# Patient Record
Sex: FEMALE | Race: OTHER | HISPANIC OR LATINO | Employment: FULL TIME | URBAN - METROPOLITAN AREA
[De-identification: names, ages, dates, MRNs, and addresses within clinical notes are randomized per-mention and may not be internally consistent; named-entity substitution may affect disease eponyms.]

---

## 2017-01-11 ENCOUNTER — ALLSCRIPTS OFFICE VISIT (OUTPATIENT)
Dept: OTHER | Facility: OTHER | Age: 32
End: 2017-01-11

## 2017-02-01 ENCOUNTER — ALLSCRIPTS OFFICE VISIT (OUTPATIENT)
Dept: OTHER | Facility: OTHER | Age: 32
End: 2017-02-01

## 2017-02-03 ENCOUNTER — HOSPITAL ENCOUNTER (EMERGENCY)
Facility: HOSPITAL | Age: 32
Discharge: HOME | End: 2017-02-03
Attending: EMERGENCY MEDICINE | Admitting: EMERGENCY MEDICINE
Payer: COMMERCIAL

## 2017-02-03 VITALS
OXYGEN SATURATION: 100 % | BODY MASS INDEX: 27.6 KG/M2 | SYSTOLIC BLOOD PRESSURE: 110 MMHG | HEART RATE: 67 BPM | WEIGHT: 150 LBS | TEMPERATURE: 97.8 F | HEIGHT: 62 IN | DIASTOLIC BLOOD PRESSURE: 59 MMHG | RESPIRATION RATE: 18 BRPM

## 2017-02-03 DIAGNOSIS — N93.9 VAGINAL BLEEDING: Primary | ICD-10-CM

## 2017-02-03 LAB
APTT PPP: 29 SECONDS (ref 24–36)
BACTERIA UR QL AUTO: ABNORMAL /HPF
BASOPHILS # BLD AUTO: 0.06 THOUSANDS/ΜL (ref 0–0.1)
BASOPHILS NFR BLD AUTO: 1 % (ref 0–1)
BILIRUB UR QL STRIP: NEGATIVE
CLARITY UR: ABNORMAL
COLOR UR: YELLOW
COLOR, POC: YELLOW
EOSINOPHIL # BLD AUTO: 0.28 THOUSAND/ΜL (ref 0–0.61)
EOSINOPHIL NFR BLD AUTO: 4 % (ref 0–6)
ERYTHROCYTE [DISTWIDTH] IN BLOOD BY AUTOMATED COUNT: 13.2 % (ref 11.6–15.1)
GLUCOSE UR STRIP-MCNC: NEGATIVE MG/DL
HCG UR QL: NEGATIVE
HCT VFR BLD AUTO: 39.2 % (ref 34.8–46.1)
HGB BLD-MCNC: 13.4 G/DL (ref 11.5–15.4)
HGB UR QL STRIP.AUTO: ABNORMAL
HYALINE CASTS #/AREA URNS LPF: ABNORMAL /LPF
INR PPP: 1 (ref 0.86–1.16)
KETONES UR STRIP-MCNC: NEGATIVE MG/DL
LEUKOCYTE ESTERASE UR QL STRIP: NEGATIVE
LYMPHOCYTES # BLD AUTO: 2.77 THOUSANDS/ΜL (ref 0.6–4.47)
LYMPHOCYTES NFR BLD AUTO: 36 % (ref 14–44)
MCH RBC QN AUTO: 29.9 PG (ref 26.8–34.3)
MCHC RBC AUTO-ENTMCNC: 34.2 G/DL (ref 31.4–37.4)
MCV RBC AUTO: 88 FL (ref 82–98)
MONOCYTES # BLD AUTO: 0.57 THOUSAND/ΜL (ref 0.17–1.22)
MONOCYTES NFR BLD AUTO: 7 % (ref 4–12)
NEUTROPHILS # BLD AUTO: 4.1 THOUSANDS/ΜL (ref 1.85–7.62)
NEUTS SEG NFR BLD AUTO: 52 % (ref 43–75)
NITRITE UR QL STRIP: NEGATIVE
NON-SQ EPI CELLS URNS QL MICRO: ABNORMAL /HPF
NRBC BLD AUTO-RTO: 0 /100 WBCS
PH UR STRIP.AUTO: 6 [PH] (ref 4.5–8)
PLATELET # BLD AUTO: 369 THOUSANDS/UL (ref 149–390)
PMV BLD AUTO: 10.1 FL (ref 8.9–12.7)
PROT UR STRIP-MCNC: NEGATIVE MG/DL
PROTHROMBIN TIME: 13.3 SECONDS (ref 12–14.3)
RBC # BLD AUTO: 4.48 MILLION/UL (ref 3.81–5.12)
RBC #/AREA URNS AUTO: ABNORMAL /HPF
SP GR UR STRIP.AUTO: >=1.03 (ref 1–1.03)
UROBILINOGEN UR QL STRIP.AUTO: 0.2 E.U./DL
WBC # BLD AUTO: 7.8 THOUSAND/UL (ref 4.31–10.16)
WBC #/AREA URNS AUTO: ABNORMAL /HPF

## 2017-02-03 PROCEDURE — 96375 TX/PRO/DX INJ NEW DRUG ADDON: CPT

## 2017-02-03 PROCEDURE — 85025 COMPLETE CBC W/AUTO DIFF WBC: CPT | Performed by: EMERGENCY MEDICINE

## 2017-02-03 PROCEDURE — 81002 URINALYSIS NONAUTO W/O SCOPE: CPT | Performed by: EMERGENCY MEDICINE

## 2017-02-03 PROCEDURE — 87086 URINE CULTURE/COLONY COUNT: CPT

## 2017-02-03 PROCEDURE — 81025 URINE PREGNANCY TEST: CPT | Performed by: EMERGENCY MEDICINE

## 2017-02-03 PROCEDURE — 85610 PROTHROMBIN TIME: CPT | Performed by: EMERGENCY MEDICINE

## 2017-02-03 PROCEDURE — 96361 HYDRATE IV INFUSION ADD-ON: CPT

## 2017-02-03 PROCEDURE — 36415 COLL VENOUS BLD VENIPUNCTURE: CPT | Performed by: EMERGENCY MEDICINE

## 2017-02-03 PROCEDURE — 81001 URINALYSIS AUTO W/SCOPE: CPT

## 2017-02-03 PROCEDURE — 96374 THER/PROPH/DIAG INJ IV PUSH: CPT

## 2017-02-03 PROCEDURE — 85730 THROMBOPLASTIN TIME PARTIAL: CPT | Performed by: EMERGENCY MEDICINE

## 2017-02-03 PROCEDURE — 99284 EMERGENCY DEPT VISIT MOD MDM: CPT

## 2017-02-03 RX ORDER — KETOROLAC TROMETHAMINE 30 MG/ML
15 INJECTION, SOLUTION INTRAMUSCULAR; INTRAVENOUS ONCE
Status: COMPLETED | OUTPATIENT
Start: 2017-02-03 | End: 2017-02-03

## 2017-02-03 RX ORDER — ONDANSETRON 2 MG/ML
4 INJECTION INTRAMUSCULAR; INTRAVENOUS ONCE
Status: COMPLETED | OUTPATIENT
Start: 2017-02-03 | End: 2017-02-03

## 2017-02-03 RX ORDER — IBUPROFEN 600 MG/1
600 TABLET ORAL EVERY 6 HOURS PRN
Qty: 30 TABLET | Refills: 0 | Status: SHIPPED | OUTPATIENT
Start: 2017-02-03 | End: 2017-06-16 | Stop reason: ALTCHOICE

## 2017-02-03 RX ADMIN — ONDANSETRON 4 MG: 2 INJECTION INTRAMUSCULAR; INTRAVENOUS at 18:34

## 2017-02-03 RX ADMIN — SODIUM CHLORIDE 1000 ML: 0.9 INJECTION, SOLUTION INTRAVENOUS at 18:06

## 2017-02-03 RX ADMIN — KETOROLAC TROMETHAMINE 15 MG: 30 INJECTION, SOLUTION INTRAMUSCULAR at 18:35

## 2017-02-04 LAB — BACTERIA UR CULT: NORMAL

## 2017-02-08 ENCOUNTER — ALLSCRIPTS OFFICE VISIT (OUTPATIENT)
Dept: OTHER | Facility: OTHER | Age: 32
End: 2017-02-08

## 2017-02-08 DIAGNOSIS — D06.9 CARCINOMA IN SITU OF CERVIX: ICD-10-CM

## 2017-02-08 LAB — HCG, QUALITATIVE (HISTORICAL): NEGATIVE

## 2017-02-08 PROCEDURE — 88344 IMHCHEM/IMCYTCHM EA MLT ANTB: CPT | Performed by: OBSTETRICS & GYNECOLOGY

## 2017-02-08 PROCEDURE — 88305 TISSUE EXAM BY PATHOLOGIST: CPT | Performed by: OBSTETRICS & GYNECOLOGY

## 2017-02-08 PROCEDURE — 88342 IMHCHEM/IMCYTCHM 1ST ANTB: CPT | Performed by: OBSTETRICS & GYNECOLOGY

## 2017-02-09 ENCOUNTER — LAB REQUISITION (OUTPATIENT)
Dept: LAB | Facility: HOSPITAL | Age: 32
End: 2017-02-09
Payer: COMMERCIAL

## 2017-02-09 DIAGNOSIS — D06.9 CARCINOMA IN SITU OF CERVIX: ICD-10-CM

## 2017-03-01 ENCOUNTER — ALLSCRIPTS OFFICE VISIT (OUTPATIENT)
Dept: OTHER | Facility: OTHER | Age: 32
End: 2017-03-01

## 2017-05-02 ENCOUNTER — GENERIC CONVERSION - ENCOUNTER (OUTPATIENT)
Dept: OTHER | Facility: OTHER | Age: 32
End: 2017-05-02

## 2017-05-11 ENCOUNTER — ALLSCRIPTS OFFICE VISIT (OUTPATIENT)
Dept: OTHER | Facility: OTHER | Age: 32
End: 2017-05-11

## 2017-06-08 ENCOUNTER — ALLSCRIPTS OFFICE VISIT (OUTPATIENT)
Dept: OTHER | Facility: OTHER | Age: 32
End: 2017-06-08

## 2017-06-08 DIAGNOSIS — Z01.818 ENCOUNTER FOR OTHER PREPROCEDURAL EXAMINATION: ICD-10-CM

## 2017-06-15 ENCOUNTER — APPOINTMENT (OUTPATIENT)
Dept: LAB | Facility: HOSPITAL | Age: 32
End: 2017-06-15
Payer: COMMERCIAL

## 2017-06-15 ENCOUNTER — TRANSCRIBE ORDERS (OUTPATIENT)
Dept: LAB | Facility: HOSPITAL | Age: 32
End: 2017-06-15

## 2017-06-15 DIAGNOSIS — Z01.818 ENCOUNTER FOR OTHER PREPROCEDURAL EXAMINATION: ICD-10-CM

## 2017-06-15 LAB
ABO GROUP BLD: NORMAL
BACTERIA UR QL AUTO: ABNORMAL /HPF
BASOPHILS # BLD AUTO: 0.05 THOUSANDS/ΜL (ref 0–0.1)
BASOPHILS NFR BLD AUTO: 1 % (ref 0–1)
BILIRUB UR QL STRIP: NEGATIVE
BLD GP AB SCN SERPL QL: NEGATIVE
CLARITY UR: ABNORMAL
COLOR UR: ABNORMAL
EOSINOPHIL # BLD AUTO: 0.24 THOUSAND/ΜL (ref 0–0.61)
EOSINOPHIL NFR BLD AUTO: 2 % (ref 0–6)
ERYTHROCYTE [DISTWIDTH] IN BLOOD BY AUTOMATED COUNT: 14 % (ref 11.6–15.1)
GLUCOSE UR STRIP-MCNC: NEGATIVE MG/DL
HCT VFR BLD AUTO: 38.6 % (ref 34.8–46.1)
HGB BLD-MCNC: 13.3 G/DL (ref 11.5–15.4)
HGB UR QL STRIP.AUTO: NEGATIVE
KETONES UR STRIP-MCNC: NEGATIVE MG/DL
LEUKOCYTE ESTERASE UR QL STRIP: ABNORMAL
LYMPHOCYTES # BLD AUTO: 2.45 THOUSANDS/ΜL (ref 0.6–4.47)
LYMPHOCYTES NFR BLD AUTO: 24 % (ref 14–44)
MCH RBC QN AUTO: 29.6 PG (ref 26.8–34.3)
MCHC RBC AUTO-ENTMCNC: 34.5 G/DL (ref 31.4–37.4)
MCV RBC AUTO: 86 FL (ref 82–98)
MONOCYTES # BLD AUTO: 0.86 THOUSAND/ΜL (ref 0.17–1.22)
MONOCYTES NFR BLD AUTO: 8 % (ref 4–12)
MUCOUS THREADS UR QL AUTO: ABNORMAL
NEUTROPHILS # BLD AUTO: 6.68 THOUSANDS/ΜL (ref 1.85–7.62)
NEUTS SEG NFR BLD AUTO: 65 % (ref 43–75)
NITRITE UR QL STRIP: NEGATIVE
NON-SQ EPI CELLS URNS QL MICRO: ABNORMAL /HPF
NRBC BLD AUTO-RTO: 0 /100 WBCS
PH UR STRIP.AUTO: 5.5 [PH] (ref 4.5–8)
PLATELET # BLD AUTO: 380 THOUSANDS/UL (ref 149–390)
PMV BLD AUTO: 10.4 FL (ref 8.9–12.7)
PROT UR STRIP-MCNC: NEGATIVE MG/DL
RBC # BLD AUTO: 4.49 MILLION/UL (ref 3.81–5.12)
RBC #/AREA URNS AUTO: ABNORMAL /HPF
RH BLD: POSITIVE
SP GR UR STRIP.AUTO: 1.03 (ref 1–1.03)
SPECIMEN EXPIRATION DATE: NORMAL
UROBILINOGEN UR QL STRIP.AUTO: 0.2 E.U./DL
WBC # BLD AUTO: 10.3 THOUSAND/UL (ref 4.31–10.16)
WBC #/AREA URNS AUTO: ABNORMAL /HPF

## 2017-06-15 PROCEDURE — 85025 COMPLETE CBC W/AUTO DIFF WBC: CPT

## 2017-06-15 PROCEDURE — 81001 URINALYSIS AUTO W/SCOPE: CPT

## 2017-06-15 PROCEDURE — 86850 RBC ANTIBODY SCREEN: CPT

## 2017-06-15 PROCEDURE — 86900 BLOOD TYPING SEROLOGIC ABO: CPT

## 2017-06-15 PROCEDURE — 36415 COLL VENOUS BLD VENIPUNCTURE: CPT

## 2017-06-15 PROCEDURE — 86901 BLOOD TYPING SEROLOGIC RH(D): CPT

## 2017-06-16 RX ORDER — NORETHINDRONE ACETATE AND ETHINYL ESTRADIOL AND FERROUS FUMARATE 1MG-20(24)
1 KIT ORAL DAILY
COMMUNITY
End: 2017-06-22 | Stop reason: HOSPADM

## 2017-06-21 ENCOUNTER — ANESTHESIA EVENT (OUTPATIENT)
Dept: PERIOP | Facility: HOSPITAL | Age: 32
End: 2017-06-21
Payer: COMMERCIAL

## 2017-06-22 ENCOUNTER — ANESTHESIA (OUTPATIENT)
Dept: PERIOP | Facility: HOSPITAL | Age: 32
End: 2017-06-22
Payer: COMMERCIAL

## 2017-06-22 ENCOUNTER — HOSPITAL ENCOUNTER (OUTPATIENT)
Facility: HOSPITAL | Age: 32
Setting detail: OUTPATIENT SURGERY
Discharge: HOME | End: 2017-06-22
Attending: OBSTETRICS & GYNECOLOGY | Admitting: OBSTETRICS & GYNECOLOGY
Payer: COMMERCIAL

## 2017-06-22 VITALS
BODY MASS INDEX: 27.42 KG/M2 | HEIGHT: 62 IN | SYSTOLIC BLOOD PRESSURE: 96 MMHG | HEART RATE: 66 BPM | DIASTOLIC BLOOD PRESSURE: 57 MMHG | OXYGEN SATURATION: 99 % | WEIGHT: 149 LBS | RESPIRATION RATE: 18 BRPM | TEMPERATURE: 99.8 F

## 2017-06-22 DIAGNOSIS — Z64.1 PROBLEMS RELATED TO MULTIPARITY: ICD-10-CM

## 2017-06-22 DIAGNOSIS — Z01.411 ENCOUNTER FOR GYNECOLOGICAL EXAMINATION WITH ABNORMAL FINDING: ICD-10-CM

## 2017-06-22 LAB — EXT PREGNANCY TEST URINE: NEGATIVE

## 2017-06-22 PROCEDURE — 88342 IMHCHEM/IMCYTCHM 1ST ANTB: CPT | Performed by: OBSTETRICS & GYNECOLOGY

## 2017-06-22 PROCEDURE — 88307 TISSUE EXAM BY PATHOLOGIST: CPT | Performed by: OBSTETRICS & GYNECOLOGY

## 2017-06-22 PROCEDURE — 81025 URINE PREGNANCY TEST: CPT | Performed by: OBSTETRICS & GYNECOLOGY

## 2017-06-22 PROCEDURE — 88305 TISSUE EXAM BY PATHOLOGIST: CPT | Performed by: OBSTETRICS & GYNECOLOGY

## 2017-06-22 PROCEDURE — 88302 TISSUE EXAM BY PATHOLOGIST: CPT | Performed by: OBSTETRICS & GYNECOLOGY

## 2017-06-22 RX ORDER — ONDANSETRON 2 MG/ML
4 INJECTION INTRAMUSCULAR; INTRAVENOUS EVERY 6 HOURS PRN
Status: DISCONTINUED | OUTPATIENT
Start: 2017-06-22 | End: 2017-06-22 | Stop reason: HOSPADM

## 2017-06-22 RX ORDER — KETOROLAC TROMETHAMINE 30 MG/ML
INJECTION, SOLUTION INTRAMUSCULAR; INTRAVENOUS AS NEEDED
Status: DISCONTINUED | OUTPATIENT
Start: 2017-06-22 | End: 2017-06-22 | Stop reason: SURG

## 2017-06-22 RX ORDER — ONDANSETRON 2 MG/ML
INJECTION INTRAMUSCULAR; INTRAVENOUS AS NEEDED
Status: DISCONTINUED | OUTPATIENT
Start: 2017-06-22 | End: 2017-06-22 | Stop reason: SURG

## 2017-06-22 RX ORDER — OXYCODONE HYDROCHLORIDE 10 MG/1
10 TABLET ORAL EVERY 4 HOURS PRN
Status: DISCONTINUED | OUTPATIENT
Start: 2017-06-22 | End: 2017-06-22 | Stop reason: HOSPADM

## 2017-06-22 RX ORDER — FERRIC SUBSULFATE 0.21 G/G
LIQUID TOPICAL AS NEEDED
Status: DISCONTINUED | OUTPATIENT
Start: 2017-06-22 | End: 2017-06-22 | Stop reason: HOSPADM

## 2017-06-22 RX ORDER — LIDOCAINE HYDROCHLORIDE 40 MG/ML
SOLUTION TOPICAL AS NEEDED
Status: DISCONTINUED | OUTPATIENT
Start: 2017-06-22 | End: 2017-06-22 | Stop reason: SURG

## 2017-06-22 RX ORDER — LIDOCAINE HYDROCHLORIDE 10 MG/ML
INJECTION, SOLUTION INFILTRATION; PERINEURAL AS NEEDED
Status: DISCONTINUED | OUTPATIENT
Start: 2017-06-22 | End: 2017-06-22 | Stop reason: SURG

## 2017-06-22 RX ORDER — SODIUM CHLORIDE, SODIUM LACTATE, POTASSIUM CHLORIDE, CALCIUM CHLORIDE 600; 310; 30; 20 MG/100ML; MG/100ML; MG/100ML; MG/100ML
125 INJECTION, SOLUTION INTRAVENOUS CONTINUOUS
Status: DISCONTINUED | OUTPATIENT
Start: 2017-06-22 | End: 2017-06-22 | Stop reason: HOSPADM

## 2017-06-22 RX ORDER — IODINE SOLUTION STRONG 5% (LUGOL'S) 5 %
SOLUTION ORAL AS NEEDED
Status: DISCONTINUED | OUTPATIENT
Start: 2017-06-22 | End: 2017-06-22 | Stop reason: HOSPADM

## 2017-06-22 RX ORDER — IBUPROFEN 600 MG/1
600 TABLET ORAL EVERY 6 HOURS PRN
Status: DISCONTINUED | OUTPATIENT
Start: 2017-06-22 | End: 2017-06-22 | Stop reason: HOSPADM

## 2017-06-22 RX ORDER — ROCURONIUM BROMIDE 10 MG/ML
INJECTION, SOLUTION INTRAVENOUS AS NEEDED
Status: DISCONTINUED | OUTPATIENT
Start: 2017-06-22 | End: 2017-06-22 | Stop reason: SURG

## 2017-06-22 RX ORDER — GLYCOPYRROLATE 0.2 MG/ML
INJECTION INTRAMUSCULAR; INTRAVENOUS AS NEEDED
Status: DISCONTINUED | OUTPATIENT
Start: 2017-06-22 | End: 2017-06-22 | Stop reason: SURG

## 2017-06-22 RX ORDER — PROMETHAZINE HYDROCHLORIDE 25 MG/ML
12.5 INJECTION, SOLUTION INTRAMUSCULAR; INTRAVENOUS ONCE AS NEEDED
Status: COMPLETED | OUTPATIENT
Start: 2017-06-22 | End: 2017-06-22

## 2017-06-22 RX ORDER — DEXTROSE MONOHYDRATE AND SODIUM CHLORIDE 5; .45 G/100ML; G/100ML
125 INJECTION, SOLUTION INTRAVENOUS CONTINUOUS
Status: DISCONTINUED | OUTPATIENT
Start: 2017-06-22 | End: 2017-06-22 | Stop reason: HOSPADM

## 2017-06-22 RX ORDER — FENTANYL CITRATE 50 UG/ML
INJECTION, SOLUTION INTRAMUSCULAR; INTRAVENOUS AS NEEDED
Status: DISCONTINUED | OUTPATIENT
Start: 2017-06-22 | End: 2017-06-22 | Stop reason: SURG

## 2017-06-22 RX ORDER — PROPOFOL 10 MG/ML
INJECTION, EMULSION INTRAVENOUS AS NEEDED
Status: DISCONTINUED | OUTPATIENT
Start: 2017-06-22 | End: 2017-06-22 | Stop reason: SURG

## 2017-06-22 RX ORDER — MIDAZOLAM HYDROCHLORIDE 1 MG/ML
INJECTION INTRAMUSCULAR; INTRAVENOUS AS NEEDED
Status: DISCONTINUED | OUTPATIENT
Start: 2017-06-22 | End: 2017-06-22 | Stop reason: SURG

## 2017-06-22 RX ORDER — OXYCODONE HYDROCHLORIDE AND ACETAMINOPHEN 5; 325 MG/1; MG/1
1 TABLET ORAL EVERY 4 HOURS PRN
Status: DISCONTINUED | OUTPATIENT
Start: 2017-06-22 | End: 2017-06-22 | Stop reason: HOSPADM

## 2017-06-22 RX ORDER — FENTANYL CITRATE/PF 50 MCG/ML
25 SYRINGE (ML) INJECTION
Status: COMPLETED | OUTPATIENT
Start: 2017-06-22 | End: 2017-06-22

## 2017-06-22 RX ORDER — OXYCODONE HYDROCHLORIDE AND ACETAMINOPHEN 5; 325 MG/1; MG/1
1 TABLET ORAL EVERY 4 HOURS PRN
Qty: 20 TABLET | Refills: 0 | Status: SHIPPED | OUTPATIENT
Start: 2017-06-22 | End: 2017-07-02

## 2017-06-22 RX ORDER — LIDOCAINE HYDROCHLORIDE AND EPINEPHRINE 10; 10 MG/ML; UG/ML
INJECTION, SOLUTION INFILTRATION; PERINEURAL AS NEEDED
Status: DISCONTINUED | OUTPATIENT
Start: 2017-06-22 | End: 2017-06-22 | Stop reason: HOSPADM

## 2017-06-22 RX ADMIN — SODIUM CHLORIDE, SODIUM LACTATE, POTASSIUM CHLORIDE, AND CALCIUM CHLORIDE: .6; .31; .03; .02 INJECTION, SOLUTION INTRAVENOUS at 07:30

## 2017-06-22 RX ADMIN — FENTANYL CITRATE 25 MCG: 50 INJECTION INTRAMUSCULAR; INTRAVENOUS at 10:15

## 2017-06-22 RX ADMIN — KETOROLAC TROMETHAMINE 30 MG: 30 INJECTION, SOLUTION INTRAMUSCULAR at 08:38

## 2017-06-22 RX ADMIN — DEXAMETHASONE SODIUM PHOSPHATE 10 MG: 10 INJECTION INTRAMUSCULAR; INTRAVENOUS at 08:30

## 2017-06-22 RX ADMIN — PROMETHAZINE HYDROCHLORIDE 12.5 MG: 25 INJECTION INTRAMUSCULAR; INTRAVENOUS at 09:18

## 2017-06-22 RX ADMIN — LIDOCAINE HYDROCHLORIDE 1 APPLICATION: 40 SOLUTION TOPICAL at 07:55

## 2017-06-22 RX ADMIN — NEOSTIGMINE METHYLSULFATE 2.5 MG: 1 INJECTION, SOLUTION INTRAMUSCULAR; INTRAVENOUS; SUBCUTANEOUS at 08:55

## 2017-06-22 RX ADMIN — PROPOFOL 200 MG: 10 INJECTION, EMULSION INTRAVENOUS at 07:50

## 2017-06-22 RX ADMIN — FENTANYL CITRATE 25 MCG: 50 INJECTION INTRAMUSCULAR; INTRAVENOUS at 09:20

## 2017-06-22 RX ADMIN — SODIUM CHLORIDE, SODIUM LACTATE, POTASSIUM CHLORIDE, AND CALCIUM CHLORIDE: .6; .31; .03; .02 INJECTION, SOLUTION INTRAVENOUS at 08:05

## 2017-06-22 RX ADMIN — SODIUM CHLORIDE, SODIUM LACTATE, POTASSIUM CHLORIDE, AND CALCIUM CHLORIDE 125 ML/HR: .6; .31; .03; .02 INJECTION, SOLUTION INTRAVENOUS at 09:54

## 2017-06-22 RX ADMIN — FENTANYL CITRATE 25 MCG: 50 INJECTION INTRAMUSCULAR; INTRAVENOUS at 09:41

## 2017-06-22 RX ADMIN — ONDANSETRON 4 MG: 2 INJECTION INTRAMUSCULAR; INTRAVENOUS at 08:30

## 2017-06-22 RX ADMIN — OXYCODONE HYDROCHLORIDE AND ACETAMINOPHEN 1 TABLET: 5; 325 TABLET ORAL at 11:20

## 2017-06-22 RX ADMIN — FENTANYL CITRATE 50 MCG: 50 INJECTION, SOLUTION INTRAMUSCULAR; INTRAVENOUS at 07:55

## 2017-06-22 RX ADMIN — ROCURONIUM BROMIDE 25 MG: 10 INJECTION, SOLUTION INTRAVENOUS at 07:50

## 2017-06-22 RX ADMIN — MIDAZOLAM HYDROCHLORIDE 2 MG: 1 INJECTION, SOLUTION INTRAMUSCULAR; INTRAVENOUS at 07:40

## 2017-06-22 RX ADMIN — FENTANYL CITRATE 25 MCG: 50 INJECTION INTRAMUSCULAR; INTRAVENOUS at 09:15

## 2017-06-22 RX ADMIN — FENTANYL CITRATE 50 MCG: 50 INJECTION, SOLUTION INTRAMUSCULAR; INTRAVENOUS at 08:23

## 2017-06-22 RX ADMIN — FENTANYL CITRATE 50 MCG: 50 INJECTION, SOLUTION INTRAMUSCULAR; INTRAVENOUS at 09:05

## 2017-06-22 RX ADMIN — FENTANYL CITRATE 50 MCG: 50 INJECTION, SOLUTION INTRAMUSCULAR; INTRAVENOUS at 09:10

## 2017-06-22 RX ADMIN — LIDOCAINE HYDROCHLORIDE 50 MG: 10 INJECTION, SOLUTION INFILTRATION; PERINEURAL at 07:50

## 2017-06-22 RX ADMIN — GLYCOPYRROLATE 0.4 MG: 0.2 INJECTION INTRAMUSCULAR; INTRAVENOUS at 08:55

## 2017-06-23 ENCOUNTER — GENERIC CONVERSION - ENCOUNTER (OUTPATIENT)
Dept: OTHER | Facility: OTHER | Age: 32
End: 2017-06-23

## 2017-06-28 ENCOUNTER — GENERIC CONVERSION - ENCOUNTER (OUTPATIENT)
Dept: OTHER | Facility: OTHER | Age: 32
End: 2017-06-28

## 2018-01-10 NOTE — MISCELLANEOUS
Message  telephone call to patient to inform of positive UTI  Instructed to pick-up script and take as directed until completed      Active Problems    1  Abnormal cells of cervix (622 10) (N87 9)   2  Cervical dysplasia (622 10) (N87 9)   3  ALVERTO III (cervical intraepithelial neoplasia grade III) with severe dysplasia (233 1) (D06 9)   4  Encounter for fetal anatomic survey (V28 81) (Z36)   5  Encounter for screening for risk of pre-term labor (V28 82) (Z36)   6  [address] multipara (V61 5) (Z64 1)   7  Headache (784 0) (R51)   8  Supervision of normal pregnancy (V22 1) (Z34 90)   9  Urinary pain (788 1) (R30 9)   10  UTI (urinary tract infection) (599 0) (N39 0)    Current Meds   1  Fioricet -40 MG Oral Capsule; 1-2 tabs every 4 - 6 hours as needed for mild to   moderate headache; Therapy: 47XUC0210 to (Last IJ:72LAU4952)  Requested for: 46TEY7235 Ordered   2  Nitrofurantoin Monohyd Macro 100 MG Oral Capsule; TAKE 1 CAPSULE EVERY 12   HOURS DAILY; Therapy: 24RIX0975 to (Philipp Guzman)  Requested for: 90ZAO3944; Last   Rx:25Edk4117; Status: ACTIVE - Transmit to Pharmacy - Awaiting Verification Ordered   3  Prenatal 28-0 8 MG Oral Tablet; Therapy: 13Cig7960 to Recorded    Allergies    1  No Known Drug Allergies    2  No Known Environmental Allergies   3   No Known Food Allergies    Signatures   Electronically signed by : Amy Licea RN; Jul 18 2016  1:18PM EST                       (Author)

## 2018-01-10 NOTE — MISCELLANEOUS
Message  telephone call to patient to inform of need for 3hour GTT  Will pick-up slip and have drawn tomorrow      Active Problems    1  Abnormal cells of cervix (622 10) (N87 9)   2  Cervical dysplasia (622 10) (N87 9)   3  ALVERTO III (cervical intraepithelial neoplasia grade III) with severe dysplasia (233 1) (D06 9)   4  Encounter for fetal anatomic survey (V28 81) (Z36)   5  Encounter for pregnancy related examination in third trimester (V22 1) (Z34 83)   6  Encounter for screening for risk of pre-term labor (V28 82) (Z36)   7  [address] multipara (V61 5) (Z64 1)   8  Headache (784 0) (R51)   9  Need for Tdap vaccination (V06 1) (Z23)   10  Polyhydramnios in winter pregnancy in third trimester (657 03) (O40 3XX0)   11  Supervision of normal pregnancy (V22 1) (Z34 90)   12  Urinary pain (788 1) (R30 9)   13  UTI (urinary tract infection) (599 0) (N39 0)    Current Meds   1  Fioricet -40 MG Oral Capsule; 1-2 tabs every 4 - 6 hours as needed for mild to   moderate headache; Therapy: 94OAF3603 to (Last WW:32ZGV2298)  Requested for: 12PRT6544 Ordered   2  Nitrofurantoin Monohyd Macro 100 MG Oral Capsule; TAKE 1 CAPSULE EVERY 12   HOURS DAILY; Therapy: 79FOW0481 to (Pradip Edwards)  Requested for: 87IHH0884; Last   Rx:32Pfz3240 Ordered   3  Prenatal 28-0 8 MG Oral Tablet; Therapy: 84Shc2121 to Recorded    Allergies    1  No Known Drug Allergies    2  No Known Environmental Allergies   3   No Known Food Allergies    Signatures   Electronically signed by : Surinder Foy RN; Sep  1 2016  1:16PM EST                       (Author)

## 2018-01-10 NOTE — PROGRESS NOTES
MAY 16 2016         RE: Edgar Banks                                      To: [facility]   MR#: [id number]                                   [address] [place] [address]   : [address] [facility], [address]   ENC: [id number]                             Fax: [phone]   (Exam #: [id number])      The LMP of this 33year old,  G6, P5-0-0-5 patient was DEC 25 2015, giving   her an YOLY of SEP 30 2016 and a current gestational age of 25 weeks 3 days   by dates  A sonographic examination was performed on MAY 16 2016 using   real time equipment  The patient has a BMI of 27 6  Her blood pressure   today was 101/54  Earliest ultrasound found in her record: 3/1/16   9w2d  10/2/16 YOLY      John Gordillo reports occasional vaginal spotting  Otherwise, she has no complaints  She reports regular fetal movement  John Gordillo declined genetic screening earlier   in the pregnancy        Cardiac motion was observed at 138 bpm       INDICATIONS      long  interval pregnancy   fetal anatomical survey      Exam Types      LEVEL II   Transvaginal      RESULTS      Fetus # 1 of 1   Transverse presentation   Fetal growth appeared normal   Placenta Location = Posterior   Placenta Grade = I      MEASUREMENTS (* Included In Average GA)      AC              16 0 cm        20 weeks 6 days* (59%)   BPD              4 7 cm        20 weeks 1 day * (45%)   HC              17 4 cm        19 weeks 6 days* (33%)   Femur            3 4 cm        20 weeks 6 days* (50%)      Nuchal Fold      3 3 mm      Humerus          3 2 cm        20 weeks 5 days  (57%)   Radius           2 6 cm        20 weeks 6 days   Ulna             3 0 cm        21 weeks 0 days   Tibia            2 9 cm        20 weeks 5 days  (51%)   Fibula           2 9 cm        19 weeks 6 days   Foot             3 5 cm        20 weeks 5 days      Cerebellum       2 2 cm        21 weeks 2 days   Biorbit          3 2 cm        20 weeks 3 days CisternaMagna    3 2 mm      HC/AC           1 09   FL/AC           0 21   FL/BPD          0 72   EFW (Ac/Fl/Hc)   373 grams - 0 lbs 13 oz      THE AVERAGE GESTATIONAL AGE is 20 weeks 4 days +/- 10 days  AMNIOTIC FLUID         Largest Vertical Pocket = 5 4 cm   Amniotic Fluid: Normal      ANATOMY SUMMARY      The fetal cranium appeared normal in shape  Choroid plexus cysts are not   present  The lateral ventricles were not dilated and the midline   structures were not deviated  The cerebellum and cisterna magna were   visualized and appeared normal  The nuchal fold is not abnormally   thickened, measured at 3 3 mm  The calvarium showed no evidence of defect,   scalloping of the parietal bones or abnormal shape  The cavum septum   pellucidum appears normal  The fetal face appears normal  There is no   evidence of facial clefting, hypotelorism, hypertelorism or micrognathia  A normal appearing nasal bone measuring 8 5 mm was identified in the   sagittal profile view  3-D views of the face appeared normal  Anatomy of   the fetal thorax appeared within normal limits  The fetal diaphragm   appears intact  There were no intrathoracic masses noted or evidence of   pleural/pericardial effusion  The cardiac arch views appeared normal     There was no suspicion of tricuspid regurgitation  The cardiac size and   structures appeared sonographically normal at the four chamber view, and   cardiac rhythm was regular  There is no suspicion of fetal dysrhythmia  There was no evidence of cardiomegaly, pericardial effusion or   atrial/ventricular disproportion  Two atrioventricular valves were noted   with normal inflow, confirmed with color Doppler imaging  Ventriculoarterial connections are appropriate and normal short axis of   the great vessels is demonstrated  The interventricular septum appeared to   be intact  There is no suspicion of an echogenic intracardiac focus    The   three vessel  tracheal view appears normal   The outflow tract views are   normal  The abdominal cavity appears normal  There is no evidence of fetal   bowel obstruction or abnormally echogenic bowel  Ascites is not present  The fetal stomach appears normal in size and shape  The right kidney   appears normal  There is no suspicion of pyelectasis  Renal cysts are not   present  The echogenicity of the kidney is normal  The left kidney   appears normal   There is no suspicion of pyelectasis  The echogenicity   of the kidney is normal   renal cysts are not present  The fetal bladder   appears normal in size and shape  There is no suspicion of ureterocele  The abdominal wall appears intact  A normal abdominal cord insertion is   noted  The spine was visualized from cervical to sacral region, within the   resolution of the ultrasound equipment, without evidence of a neural tube   defect  or other malformation  Active movement of the extremities was seen   and fetal body motion was also observed during this examination  There was   no evidence of long bone abnormality and the extremities were followed to   their distal extent  There was no evidence of club foot or rocker bottom   deformity  There was no evidence of abnormal hand posturing noted  The   genitalia appeared normal  The placenta appears normal  There is no   evidence of advanced placental maturation, placental abruption,   intervillous thrombosis, placental infarction or multiple venous lakes  There is a 3 vessel cord with normal insertion site  The uterine contour   appears normal  There is no suspicion of a uterine myoma  ADNEXA      The left ovary appeared normal and measured 2 9 x 1 8 x 3 0 cm with a   volume of 8 2 cc  The right ovary appeared normal and measured 2 5 x 2 0 x   1 9 cm with a volume of 5 0 cc  CERVICAL EVALUATION      The cervix appeared normal (Ultrasound Examination)        SUPINE      Cervical Length: 3 70 cm      OTHER TEST RESULTS Funneling?: No             Dynamic Changes?: No        Resp  To TFP?: No      IMPRESSION      Ervin IUP   20 weeks and 4 days by this ultrasound  (YOLY=SEP 29 2016)   Transverse presentation   Fetal growth appeared normal   Normal anatomy survey   Regular fetal heart rate of 138 bpm   Posterior placenta      GENERAL COMMENT      No fetal structural abnormality or ultrasound marker for aneuploidy is   identified on the Level II ultrasound study today  Fetal growth and   amniotic fluid volume are normal   The placenta is normal in appearance  The cervix is normal in appearance by transvaginal sonography  The   cervical length is normal   Cervical debris is not present  Cervical   funneling is not present  Neither provocative nor dynamic change is   appreciated  Today's ultrasound findings and suggested follow-up were discussed in   detail with Simin Hernandez  We discussed that prenatal ultrasound cannot rule out all   congenital abnormalities  Simin Hernandez will return to the [address] [place] [address] at about   32 weeks gestation to assess fetal interval growth for the indication of a   long interval pregnancy  The face to face time, in addition to time spent discussing ultrasound   results, was 10 minutes, greater than 50% of which was spent during   counseling and coordination of care  LIZ Palmer M D     Maternal-Fetal Medicine   Electronically signed 05/16/16 12:54

## 2018-01-11 NOTE — RESULT NOTES
Message  messages left on pts  listed telephone number [phone] regarding need for colposcopy f/u appointment  Pt  also cancelled 1 f/u appt  and no-showed for another  Letter sent to pts  listed home address regular and certified mail, asking her to call office ASAP to schedule appt        Signatures   Electronically signed by : Maria M Gilbert RN; May  2 2017  3:25PM EST                       (Author)

## 2018-01-12 NOTE — RESULT NOTES
Verified Results  (1) THIN PREP PAP WITH IMAGING [id number] 12:09PM [facility]     Test Name Result Flag Reference   LAB AP CASE REPORT (Report)     Gynecologic Cytology Report            Case: [id number]                  Authorizing Provider: Sanjana Marley       Collected:      03/01/2016 1209        First Screen:     CARINE Walker    Received:      03/03/2016 1209        Pathologist:      Jesus Stubbs MD                                 Specimen:  LIQUID-BASED PAP, SCREENING, Cervix   HPV HIGH RISK RESULT (Report)     HPV, High Risk: HPV POS, HPV16 NEG, HPV18 NEG      Other High Risk HPV Positive, HPV 16 Negative, HPV 18 Negative  Specimen is positive for the DNA of any one of, or combination of the following high risk HPV types: 22,93,63,70,77,67,54,43,06,09,44,56  HPV types 16 and 18 DNA were undetectable or below the pre-set threshold  [employer]'s FDA approved Zully 4800 is utilized with strict adherence to the 's instruction  manual to test for the presence of High-Risk HPV DNA, as well as HPV 16 and HPV 18  This instrument  has been validated by our laboratory and/or by the   A negative result does not preclude the presence of HPV infection because results depend on adequate  specimen collection, absence of inhibitors and sufficient DNA to be detected  Additionally, HPV negative  results are not intended to prevent women from proceeding to colposcopy if clinically warranted  Positive HPV test results indicate the presence of any one or more of the high risk types, but since patients  are often co-infected with low-risk types it does not rule out the presence of low-risk types in patients  with mixed infections  LAB AP GYN PRIMARY INTERPRETATION      Epithelial cell abnormality   LAB AP GYN INTERPRETATION      High grade squamous intraepithelial lesion   LAB AP GYN SPECIMEN ADEQUACY      Satisfactory for evaluation  Endocervical/transformation zone component present  LAB AP GYN NOTE      Intradepartmental consultation concurs with the diagnosis  Interpret[facility] [address] [facility]  LAB AP GYN ADDITIONAL INFORMATION (Report)     "[employer]'s FDA approved ,  and ThinPrep Imaging System are   utilized with strict adherence to the 's instruction manual to   prepare gynecologic and non-gynecologic cytology specimens for the   production of ThinPrep slides as well as for gynecologic ThinPrep imaging  These processes have been validated by our laboratory and/or by the     The Pap test is not a diagnostic procedure and should not be used as the   sole means to detect cervical cancer  It is only a screening procedure to   aid in the detection of cervical cancer and its precursors  Both   false-negative and false-positive results have been experienced  Your   patient's test result should be interpreted in this context together with   the history and clinical findings     LAB AP LMP not given     not given

## 2018-01-12 NOTE — MISCELLANEOUS
Provider Comments  Provider Comments:   Called patient to reschedule appointment results  [place] mail was [place]  I did leave message to reschedule appt          Signatures   Electronically signed by : Saundra Holloway, ; Mar  1 2017  3:33PM EST                       (Author)

## 2018-01-12 NOTE — MISCELLANEOUS
Message  Pt declined Stepwise testing per note in prenatal record on 5/2/16  Active Problems    1  Abnormal cells of cervix (622 10) (N87 9)   2  Cervical dysplasia (622 10) (N87 9)   3  ALVERTO III (cervical intraepithelial neoplasia grade III) with severe dysplasia (233 1) (D06 9)   4  [address] multipara (V61 5) (Z64 1)   5  Headache (784 0) (R51)   6  Supervision of normal pregnancy (V22 1) (Z34 90)    Current Meds   1  Fioricet -40 MG Oral Capsule; 1-2 tabs every 4 - 6 hours as needed for mild to   moderate headache; Therapy: 74ARQ7953 to (Last QT:62JCF0102)  Requested for: 60EPH0547 Ordered   2  Prenatal 28-0 8 MG Oral Tablet; Therapy: 72Dod2369 to Recorded    Allergies    1  No Known Drug Allergies    2  No Known Environmental Allergies   3   No Known Food Allergies    Signatures   Electronically signed by : Mark Brown RN; May  9 2016 11:16AM EST                       (Author)

## 2018-01-12 NOTE — MISCELLANEOUS
Provider Comments  Provider Comments:   CALLED PATIENT MULTIPLE TIMES AND LEFT MESSAGES TO RESCHEDULE APPOINTMENT FOR COLPO        Signatures   Electronically signed by : Amy Tamayo MD; Jan 26 2017  8:48AM EST

## 2018-01-13 VITALS
DIASTOLIC BLOOD PRESSURE: 79 MMHG | BODY MASS INDEX: 28.04 KG/M2 | WEIGHT: 152.38 LBS | HEIGHT: 62 IN | SYSTOLIC BLOOD PRESSURE: 115 MMHG

## 2018-01-13 NOTE — PROGRESS NOTES
MAR 21 2016         RE: Bhargav Sanders                                      To: [address]   MR#: [id number]                                   [address] [place] [address]   : [address] [facility], [address]   ENC: [id number]                             Fax: [phone]   (Exam #: [id number])      The LMP of this 26year old,  6, para 5 patient was DEC 25 2015,   giving her an YOLY of SEP 27 2016 and a current gestational age of 16 weeks   3 days by dates  A sonographic examination was performed on MAR 21 2016   using real time equipment  The ultrasound examination was performed using   abdominal technique  The patient has a BMI of 26 3  Her blood pressure   today was 113/63  Earliest ultrasound found in her record: 3/1/16   9w2d  10/2/16 YOLY      Patient is a  6 para 5  She is on prenatal vitamins and has no   known drug allergies  Patient has had 5 prior vaginal deliveries that   weighed between 6 lbs  8 oz  and 8 lbs  4 oz  without complication  She   denies any significant past medical history or past surgical history  She   denies any first generation family history of hypertension, diabetes,   thrombosis or congenital anomalies  In January at 4 weeks of pregnancy she   was treated with Cipro and Augmentin for a spider bite for 14 days  She is   a prior smoker but has stopped in this pregnancy  She denies any use of   alcohol or illicit drugs  She has received a flu shot in this pregnancy  Her last pregnancy was 11 years ago when she was 22years of age  Multiple longitudinal and transverse sections revealed a winter   intrauterine pregnancy with the fetus in variable presentation  The   placenta is posterior in implantation, grade 0 in appearance        Cardiac motion was observed at 148 bpm       INDICATIONS      first trimester genetic screening   grand multip   long  interval pregnancy      Exam Types      Level I RESULTS      Fetus # 1 of 1   Variable presentation      MEASUREMENTS (* Included In Average GA)      CRL              6 4 cm        12 weeks 4 days*   Nuchal Trans    2 50 mm      THE AVERAGE GESTATIONAL AGE is 12 weeks 4 days +/- 7 days  UTERINE ARTERIES                                  S/D   PI    RI    NOTCH       Left Uterine Artery        3 86  1 69  0 74       Right Uterine Artery       3 50  1 38  0 71      ANATOMY COMMENTS      Anatomic detail is limited at this gestational age  The yolk sac was not   noted  The fetal cranium appeared normal in shape and the nuchal   translucency was normal in size (2 5mm)  The nasal bone appears to be   present  The intracranial anatomy was unremarkable  Evaluation of the   spine revealed no obvious evidence for a neural tube defect  Anatomy of   the fetal thorax appeared within normal limits  The cardiac rhythm was   regular  Within the abdomen, stomach & bladder were visualized and the   abdominal wall appeared intact  A three vessel cord appears to be present  Active movement of the fetal body & extremities was seen  The placental   cord insertion was marginal    The uterine artery Dopplers are normal for   this gestational age  There is no suspicion of a uterine myoma  Free fluid   is not seen in the posterior cul-de-sac  ADNEXA      The left ovary appeared normal and measured 2 7 x 2 7 x 1 7 cm with a   volume of 6 5 cc  The right ovary appeared normal and measured 3 2 x 1 0 x   1 3 cm with a volume of 2 2 cc        AMNIOTIC FLUID      Largest Vertical Pocket = 2 4 cm   Amniotic Fluid: Normal      IMPRESSION      Ervin IUP   12 weeks and 4 days by this ultrasound  (YOLY=SEP 29 2016)   Variable presentation   Regular fetal heart rate of 148 bpm   Posterior placenta      GENERAL COMMENT      As per your request, a consultation was performed on your patient for the   following indication: sequential screen      The patient was informed of the findings and counseled about the   limitations of the exam in detecting all forms of fetal congenital   abnormalities  She denies any vaginal bleeding or uterine cramping/contractions  Exam shows she is comfortable and her abdomen is non tender  Today's ultrasound findings and suggested follow-up were discussed in   detail with the patient  The Sequential Screen was discussed in detail,   including the sensitivities for detection of Down syndrome, Trisomy 18,   and open neural tube defects  The patient underwent fingerstick blood   collection for hCG and JD-A to complete the initial component of the   Sequential Screen  Results should be available within one week  Follow up recommended:   1  Follow-up multiple marker serum screening at 16-18 weeks' gestation is   recommended to complete the Sequential Screen  2  Fetal Level II ultrasound imaging is recommended at 19-20 weeks'   gestation  LIZ Crane M D     Maternal-Fetal Medicine   Electronically signed 03/21/16 19:07

## 2018-01-13 NOTE — MISCELLANEOUS
Provider Comments  Provider Comments:   Dear Romulo Johnson,    You missed an appointment on 11/23/2016 at 2:00PM  We understand that many situations arise that occasionally prevents patients from keeping scheduled appointments  It is the policy of [facility] that patients notify us 24 hours in advance if unable to keep a scheduled appointment  Missed appointments jeopardize strong physician-patient relationships  The appointment you missed could have easily been made available to another patient if you had contacted us to cancel  We like to accommodate all of our patients, but when patients miss an appointment it prevents us from being able to help everyone  In the future, we request at least 24 hours notice of cancellation so we can make your appointment available to someone else in need         Sincerely,    The Physicians and Staff of [facility]        Signatures   Electronically signed by : Fuentes Leon, [facility]; Nov 23 2016  4:04PM EST                       (Author)

## 2018-01-13 NOTE — PROGRESS NOTES
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  Plan:  Fup in 2 weeks for PNV and results  Management will be base on path and ASCCP Guidelines  Chief Complaint  Chief Complaint Free Text Note Form: PN & Colpo, non-smoker      Active Problems    1  Abnormal cells of cervix (622 10) (N87 9)   2  [address] multipara (V61 5) (Z64 1)   3  Supervision of normal pregnancy (V22 1) (Z34 90)    Family History    1  Family history of malignant neoplasm (V16 9) (Z80 9)    Social History    · Former smoker (V15 82) (R66 592)    Current Meds   1  Prenatal 28-0 8 MG Oral Tablet; Therapy: 72Gzr1493 to Recorded    Allergies    1  No Known Drug Allergies    2  No Known Environmental Allergies   3  No Known Food Allergies    Vitals  Vital Signs [Data Includes: Current Encounter]    Recorded: 42VEF4589 62:43DC   Systolic 90   Diastolic 50   Height 5 ft 2 in   Weight 145 lb    BMI Calculated 26 52   BSA Calculated 1 67     Results/Data  Encounter Results   OB Global Urine Dip Non-Automated - POC 2016 01:45PM Marina Gonzales     Test Name Result Flag Reference   Protein NEG     Glucose NEG       Screen Ebola Flowsheet 2016 01:44PM      Test Name Result Flag Reference   Exposure to Ebola Virus - Within 21 Days No         Procedure    Procedure: colposcopy  Indication: high grade squamous intraepithelial lesion and PCR positive for high risk HPV  Other HR HPV positive   Risks, benefits and alternatives were discussed with the patient   We discussed possible complications, including infection and bleeding  Written consent was obtained prior to the procedure  Procedure Note:  The squamocolumnar junction was fully visualized  Observation without staining showed leukoplakia at All around the SCJ o'clock and mosaicism at All around the SCJ o'clock, but no atypical vessels  After bathing the cervix in acetic acid, evaluation showed acetowhite changes at All Around the SCJ  More prominent at the 9 and 12 oclock positions o'clock, but no atypical vessels  Vaginal Vault: no abnormalities seen  Vulva: no abnormalities seen  Cervical Biopsy: 2 biopsies taken of the cervix  the biopsies were taken at 9 and 12 oclock position o'clock  endocervical curettage was not performed Hemostasis was obtained with Monsel's solution  Patient Status: the patient tolerated the procedure well  Future Appointments    Date/Time Provider Specialty Site   2016 09:00 AM Yajaira Garcia  [facility]     Signatures   Electronically signed by :  JAROD Reese ; 2016  2:51PM EST                       (Author)    Electronically signed by : JAROD Ferrer ; 2016 12:36PM EST

## 2018-01-14 VITALS
HEIGHT: 62 IN | SYSTOLIC BLOOD PRESSURE: 111 MMHG | DIASTOLIC BLOOD PRESSURE: 79 MMHG | BODY MASS INDEX: 27.42 KG/M2 | WEIGHT: 149 LBS

## 2018-01-14 VITALS
BODY MASS INDEX: 28.52 KG/M2 | HEIGHT: 62 IN | WEIGHT: 155 LBS | SYSTOLIC BLOOD PRESSURE: 100 MMHG | DIASTOLIC BLOOD PRESSURE: 70 MMHG

## 2018-01-14 NOTE — MISCELLANEOUS
Provider Comments  Provider Comments:   PT NO SHOW FOR HER EXCESSING BLEEDING APPT        Signatures   Electronically signed by : Soy Saunders, ; Apr 5 2017 12:00PM EST                       (Author)

## 2018-01-15 NOTE — PROGRESS NOTES
AUG 22 2016         RE: Ambrosio Lazo                                      To: [facility] [place]   MR#: [id number]                                   [address] [place] [address]   : [facility], [address]   ENC: [id number]                             Fax: [phone]   (Exam #: [id number])      The LMP of this 33year old,  G6, P5-0-0-5 patient was DEC 25 2015, giving   her an YOLY of SEP 30 2016 and a current gestational age of 29 weeks 3 days   by dates  A sonographic examination was performed on AUG 22 2016 using   real time equipment  The ultrasound examination was performed using   abdominal technique  Earliest ultrasound found in her record: 3/1/16   9w2d  10/2/16 YOLY      Cardiac motion was observed at 147 bpm       INDICATIONS      polyhydramnios      Exam Types      Biophysical Profile      RESULTS      Fetus # 1 of 1   Vertex presentation   Placenta Location = Posterior, left lateral   No placenta previa   Placenta Grade = II      The NST was reactive with no decelerations  AMNIOTIC FLUID      Q1: 7 2      Q2: 6 6      Q3: 6 4      Q4: 7 4   ALYSON Total = 27 6 cm   Amniotic Fluid: POLYHYDRAMNIOS      BIOPHYSICAL PROFILE      The Biophysical Profile score was 10/10  Breathin  Movement: 2  Tone: 2  AFV: 2  NST: 2   The NST was reactive with no decelerations  IMPRESSION      Ervin IUP   Vertex presentation   Regular fetal heart rate of 147 bpm   Polyhydramnios   Posterior, left lateral placenta   No placenta previa      RECOMMENDATION      BPP: 1 Week      Aron Churchman, R D M S Percell Closs, M D     Maternal-Fetal Medicine   Electronically signed 16 10:12

## 2018-01-16 NOTE — MISCELLANEOUS
Reason For Visit  Reason For Visit Free Text Note Form: MET WITH PATIENT FOR FOLLOW UP AFTER DELIVERY  REPORTED BABY GIRL IS DOING WELL  MOM REPORTED IS UNDER A LOT OF STRESS DUE TO HOUSING AND FINANCIAL ISSUES  PATIENT CURRENTLY STAYING WITH HER DAD AND NEED TO MOVE ASAP  NOT RECEPTIVE TO MENTAL HEALTH EVALUATION AT THIS TIME  DENIES ANY SI / HI  WILL CONTACT SW AT ANY TIME NEEDED AND WILL GO TO NEAREST ED IF S SX'S WORSEN  Active Problems    1  Abnormal cells of cervix (622 10) (N87 9)   2  Abnormal GTT (glucose tolerance test) (790 22) (R73 02)   3  Cervical dysplasia (622 10) (N87 9)   4  ALVERTO III (cervical intraepithelial neoplasia grade III) with severe dysplasia (233 1) (D06 9)   5  Encounter for fetal anatomic survey (V28 81) (Z36)   6  Encounter for pregnancy related examination in third trimester (V22 1) (Z34 83)   7  Encounter for screening for risk of pre-term labor (V28 82) (Z36)   8  Grand multipara (V61 5) (Z64 1)   9  Headache (784 0) (R51)   10  Need for Tdap vaccination (V06 1) (Z23)   11  Polyhydramnios in winter pregnancy in third trimester (657 03) (O40 3XX0)   12  Supervision of normal pregnancy (V22 1) (Z34 90)   13  Urinary pain (788 1) (R30 9)   14  UTI (urinary tract infection) (599 0) (N39 0)    Current Meds   1  Prenatal 28-0 8 MG Oral Tablet; Therapy: 95Lqu4037 to Recorded    Allergies    1  No Known Drug Allergies    2  No Known Environmental Allergies   3   No Known Food Allergies    Signatures   Electronically signed by : FELICIANO Siddiqui; Oct  4 2016 12:04PM EST                       (Author)

## 2018-01-17 NOTE — MISCELLANEOUS
Message  patient was a no show on 11/17/2016 for colpo appt  called patient regarding no show   no answer, unable to leave a message  appt was made for 12/1/2016 and mailed to patient      Active Problems    1  Abnormal cells of cervix (622 10) (N87 9)   2  Abnormal GTT (glucose tolerance test) (790 22) (R73 02)   3  Cervical dysplasia (622 10) (N87 9)   4  ALVERTO III (cervical intraepithelial neoplasia grade III) with severe dysplasia (233 1) (D06 9)   5  Encounter for fetal anatomic survey (V28 81) (Z36)   6  Encounter for postpartum visit (V24 2) (Z39 2)   7  Encounter for pregnancy related examination in third trimester (V22 1) (Z34 83)   8  Encounter for screening for risk of pre-term labor (V28 82) (Z36)   9  [address] multipara (V61 5) (Z64 1)   10  Headache (784 0) (R51)   11  Need for Tdap vaccination (V06 1) (Z23)   12  Polyhydramnios in winter pregnancy in third trimester (657 03) (O40 3XX0)   13  Supervision of normal pregnancy (V22 1) (Z34 90)   14  Urinary pain (788 1) (R30 9)   15  UTI (urinary tract infection) (599 0) (N39 0)    Current Meds   1  Prenatal 28-0 8 MG Oral Tablet; Therapy: 61Oqo2181 to Recorded    Allergies    1  No Known Drug Allergies    2  No Known Environmental Allergies   3   No Known Food Allergies    Signatures   Electronically signed by : Aleksandra Bernardo, ; Nov 17 2016  2:26PM EST                       (Author)

## 2018-01-17 NOTE — MISCELLANEOUS
Reason For Visit  Reason For Visit Free Text Note Form: PN PATIENT SEEN FOR ASSESS  Case Management Documentation [facility]:   Information obtained from the patient and medical record  The patient is receiving [facility] assistance  Patient's financial status unemployed  Patient is participating in [employer]  Action Plan: information provided  Progress Note  MET WITH 32 Y/O- S- G6:P5-  BILINGUAL WOMAN  PATIENT RESIDES WITH [facility] AND HER 3 YOUNGEST KIDS  PLAN PREGNANCY, WANTS TUBAL LIGATION AFTER DELIVERY  PATIENT DENIES ANY USAGE OF DRUG, ALCOHOL OR SMOKING  NO MENTAL HEALTH HISTORY OR DV ISSUES  HAS NEEDED ITEMS FOR UNBORN BABY  DISCUSSED ACKNOWLEDGEMENT OF PATERNITY FORM AND POST PARTUM DEPRESSION SIGNS AND SYMPTOMS  VERBALIZED UNDERSTANDING  NO CONCERN AT THIS TIME  WILL FOLLOW UP AS NEEDED  Active Problems    1  Abnormal cells of cervix (622 10) (N87 9)   2  Abnormal GTT (glucose tolerance test) (790 22) (R73 02)   3  Cervical dysplasia (622 10) (N87 9)   4  ALVERTO III (cervical intraepithelial neoplasia grade III) with severe dysplasia (233 1) (D06 9)   5  Encounter for fetal anatomic survey (V28 81) (Z36)   6  Encounter for pregnancy related examination in third trimester (V22 1) (Z34 83)   7  Encounter for screening for risk of pre-term labor (V28 82) (Z36)   8  Grand multipara (V61 5) (Z64 1)   9  Headache (784 0) (R51)   10  Need for Tdap vaccination (V06 1) (Z23)   11  Polyhydramnios in winter pregnancy in third trimester (657 03) (O40 3XX0)   12  Supervision of normal pregnancy (V22 1) (Z34 90)   13  Urinary pain (788 1) (R30 9)   14  UTI (urinary tract infection) (599 0) (N39 0)    Current Meds   1  Fioricet -40 MG Oral Capsule; 1-2 tabs every 4 - 6 hours as needed for mild to   moderate headache; Therapy: 40CYF8000 to (Last VE:20XJX4540)  Requested for: 21RIA6771 Ordered   2   Nitrofurantoin Monohyd Macro 100 MG Oral Capsule; TAKE 1 CAPSULE EVERY 12   HOURS DAILY; Therapy: 30OGH9849 to (Aiyana Bautista)  Requested for: 18OGB4249; Last   Rx:22Lui2601 Ordered   3  Prenatal 28-0 8 MG Oral Tablet; Therapy: 95Mtn3944 to Recorded    Allergies    1  No Known Drug Allergies    2  No Known Environmental Allergies   3   No Known Food Allergies    Future Appointments    Date/Time Provider Specialty Site   2016 01:00 PM  [facility]   2016 01:30 PM  [address]   2016 10:00 AM [facility], [facility] [address]     Signatures   Electronically signed by : Tea Rich, FELICIANO; Sep  6 2016  3:07PM EST                       (Author)

## 2020-02-07 ENCOUNTER — HOSPITAL ENCOUNTER (EMERGENCY)
Facility: HOSPITAL | Age: 35
Discharge: HOME | End: 2020-02-07
Attending: EMERGENCY MEDICINE

## 2020-02-07 VITALS
HEART RATE: 76 BPM | RESPIRATION RATE: 16 BRPM | WEIGHT: 138.89 LBS | OXYGEN SATURATION: 100 % | BODY MASS INDEX: 25.4 KG/M2 | SYSTOLIC BLOOD PRESSURE: 133 MMHG | DIASTOLIC BLOOD PRESSURE: 72 MMHG | TEMPERATURE: 97.4 F

## 2020-02-07 DIAGNOSIS — T78.40XA ALLERGIC REACTION, INITIAL ENCOUNTER: Primary | ICD-10-CM

## 2020-02-07 PROCEDURE — 99282 EMERGENCY DEPT VISIT SF MDM: CPT

## 2020-02-07 PROCEDURE — 99284 EMERGENCY DEPT VISIT MOD MDM: CPT | Performed by: PHYSICIAN ASSISTANT

## 2020-02-07 RX ORDER — FAMOTIDINE 20 MG/1
20 TABLET, FILM COATED ORAL 2 TIMES DAILY
Qty: 30 TABLET | Refills: 0 | Status: SHIPPED | OUTPATIENT
Start: 2020-02-07 | End: 2022-07-05

## 2020-02-07 RX ORDER — PREDNISONE 10 MG/1
TABLET ORAL
Qty: 30 TABLET | Refills: 0 | Status: SHIPPED | OUTPATIENT
Start: 2020-02-07 | End: 2022-07-05

## 2020-02-07 RX ORDER — DIPHENHYDRAMINE HCL 25 MG
25 TABLET ORAL EVERY 6 HOURS
Qty: 20 TABLET | Refills: 0 | Status: SHIPPED | OUTPATIENT
Start: 2020-02-07 | End: 2022-07-05

## 2020-02-07 NOTE — ED PROVIDER NOTES
History  Chief Complaint   Patient presents with    Rash     " rash to legs and stomach since yesterday "       History provided by:  Patient   used: No    Medical Problem   Location:  Pt with itching rash to feet and back and abdomen since last night +itching   Severity:  Mild  Onset quality:  Gradual  Duration:  1 day  Timing:  Constant  Progression:  Unchanged  Chronicity:  New  Associated symptoms: rash    Associated symptoms: no abdominal pain, no chest pain, no congestion, no cough, no diarrhea, no ear pain, no fatigue, no fever, no headaches, no loss of consciousness, no myalgias, no nausea, no rhinorrhea, no shortness of breath, no sore throat, no vomiting and no wheezing        None       Past Medical History:   Diagnosis Date    Abnormal cells of cervix     ALVERTO III (cervical intraepithelial neoplasia III)     Headache     Varicella        Past Surgical History:   Procedure Laterality Date    SC COLPOSCOPY,CERVIX W/ADJ VAG,W/LOOP BX N/A 6/22/2017    Procedure: BIOPSY LEEP CERVIX;  Surgeon: Delta Moise MD;  Location:  MAIN OR;  Service: Gynecology    SC LAP,RMV  ADNEXAL STRUCTURE Bilateral 6/22/2017    Procedure: LAPAROSCOPIC SALPINGECTOMY;  Surgeon: Delta Moise MD;  Location:  MAIN OR;  Service: Gynecology       Family History   Problem Relation Age of Onset    No Known Problems Mother     No Known Problems Father     No Known Problems Sister     No Known Problems Brother     No Known Problems Daughter     No Known Problems Son     No Known Problems Maternal Aunt     No Known Problems Maternal Uncle     No Known Problems Paternal Aunt     No Known Problems Paternal Uncle     No Known Problems Maternal Grandmother     No Known Problems Maternal Grandfather     No Known Problems Paternal Grandmother     No Known Problems Paternal Grandfather      I have reviewed and agree with the history as documented      Social History     Tobacco Use    Smoking status: Former Smoker     Packs/day: 0 25    Smokeless tobacco: Never Used   Substance Use Topics    Alcohol use: Yes    Drug use: No     Comment: none        Review of Systems   Constitutional: Negative  Negative for fatigue and fever  HENT: Negative  Negative for congestion, ear pain, rhinorrhea and sore throat  Eyes: Negative  Respiratory: Negative  Negative for cough, shortness of breath and wheezing  Cardiovascular: Negative  Negative for chest pain  Gastrointestinal: Negative  Negative for abdominal pain, diarrhea, nausea and vomiting  Endocrine: Negative  Genitourinary: Negative  Musculoskeletal: Negative  Negative for myalgias  Skin: Positive for rash  Allergic/Immunologic: Negative  Neurological: Negative  Negative for loss of consciousness and headaches  Hematological: Negative  Psychiatric/Behavioral: Negative  All other systems reviewed and are negative  Physical Exam  Physical Exam   Constitutional: She appears well-developed and well-nourished  HENT:   Head: Normocephalic  Right Ear: External ear normal    Left Ear: External ear normal    Nose: Nose normal    Mouth/Throat: Oropharynx is clear and moist    Eyes: Pupils are equal, round, and reactive to light  Conjunctivae and EOM are normal    Neck: Normal range of motion  Neck supple  Cardiovascular: Normal rate, regular rhythm, normal heart sounds and intact distal pulses  Pulmonary/Chest: Effort normal and breath sounds normal    Abdominal: Soft  Bowel sounds are normal    Musculoskeletal: Normal range of motion  Neurological: She is alert  Skin: Skin is warm  Capillary refill takes less than 2 seconds  Allergic dermatits to lower back abdomen and bilat ankles   No palms no soles  Toes from dp pulses strong bilat  +blanching no purpura no petecchaie    Psychiatric: She has a normal mood and affect  Her behavior is normal    Nursing note and vitals reviewed        Vital Signs  ED Triage Vitals [02/07/20 1319]   Temperature Pulse Respirations Blood Pressure SpO2   (!) 97 4 °F (36 3 °C) 76 16 133/72 100 %      Temp Source Heart Rate Source Patient Position - Orthostatic VS BP Location FiO2 (%)   Tympanic Monitor Sitting Left arm --      Pain Score       --           Vitals:    02/07/20 1319   BP: 133/72   Pulse: 76   Patient Position - Orthostatic VS: Sitting         Visual Acuity      ED Medications  Medications - No data to display    Diagnostic Studies  Results Reviewed     None                 No orders to display              Procedures  Procedures         ED Course                               MDM      Disposition  Final diagnoses: Allergic reaction, initial encounter     Time reflects when diagnosis was documented in both MDM as applicable and the Disposition within this note     Time User Action Codes Description Comment    2/7/2020  1:37 PM Montse Nguyễn  Add [T78 40XA] Allergic reaction, initial encounter       ED Disposition     ED Disposition Condition Date/Time Comment    Discharge Stable Fri Feb 7, 2020  1:37 PM Jesus Olivera discharge to home/self care  Follow-up Information     Follow up With Specialties Details Why [address]  [phone]            Discharge Medication List as of 2/7/2020  1:40 PM      START taking these medications    Details   diphenhydrAMINE (BENADRYL) 25 mg tablet Take 1 tablet (25 mg total) by mouth every 6 (six) hours, Starting Fri 2/7/2020, Print      famotidine (PEPCID) 20 mg tablet Take 1 tablet (20 mg total) by mouth 2 (two) times a day, Starting Fri 2/7/2020, Print      predniSONE 10 mg tablet 5 tabs po qd x 2 days then 4 tabs po qd x 2 days then 3 tabs po qd x 2 days then 2 tabs po qd x 2 days then 1 tab po qd x 2 days, Print           No discharge procedures on file      ED Provider  Electronically Signed by           Mickey Cruz PA-C  02/08/20 6699

## 2022-03-09 NOTE — PROGRESS NOTES
Carolyn Jaime is a 43 y o  female who presents for annual well woman exam  Periods are regular every 28-30 days, lasting 4-5 days  LMP 3/13/22  GYN:  · Denies vaginal discharge, labial erythema or lesions, dyspareunia  · Menses are regular, q 28 days, lasting 4-5 days  · Contraception: previously had a tubal   · Patient is sexually active with one male partner  · Gynecologic surgery: s/p LEEP, tubal ligation     OB:  · [id number] female  :  · Denies dysuria, urinary frequency or urgency  · Denies hematuria, flank pain, incontinence  Breast:  · Denies breast mass, skin changes, dimpling, reddening, nipple retraction  · Denies breast discharge  · Patient reports that her mother, two aunts and cousin all had cervical cancer  General:  · Diet: healthy  · Exercise: none, discussed importance of exercise today  · Work: [employer]  · ETOH use: denies  · Tobacco use: quit a month ago   · Recreational drug use: denies    Screening:  · Cervical cancer screenin17  LEEP + ECC :  - High grade squamous intraepithelial lesion (ALVERTO II-III) involving endocervical glands  - High grade dysplasia is present at margin of resection  - ALVERTO II-III inendocervix  3/1/17 colpo: ALVERTO 3  17: HSIL w/ HPV other pos   · STD screening: accepts  Review of Systems  Pertinent items are noted in HPI  Objective      /77   Pulse 75   Ht 5' 2" (1 575 m)   Wt 64 kg (141 lb)   LMP 2022   BMI 25 79 kg/m²     Physical Exam  Exam conducted with a chaperone present  Constitutional:       Appearance: Normal appearance  HENT:      Head: Normocephalic and atraumatic  Eyes:      Extraocular Movements: Extraocular movements intact  Neck:      Thyroid: No thyroid mass, thyromegaly or thyroid tenderness  Cardiovascular:      Rate and Rhythm: Normal rate and regular rhythm  Pulmonary:      Effort: Pulmonary effort is normal       Breath sounds: Normal breath sounds     Chest:   Breasts: Right: No swelling, bleeding, inverted nipple or mass  Left: No swelling, bleeding, inverted nipple or mass  Abdominal:      General: There is no distension  Palpations: Abdomen is soft  Tenderness: There is no abdominal tenderness  There is no guarding  Genitourinary:     Exam position: Lithotomy position  Labia:         Right: No rash or tenderness  Left: No rash or tenderness  Vagina: No signs of injury and foreign body  Bleeding present  No vaginal discharge or tenderness  Cervix: Cervical bleeding (actively) present  No cervical motion tenderness, friability or erythema  Uterus: Normal        Adnexa: Right adnexa normal and left adnexa normal    Musculoskeletal:         General: Normal range of motion  Skin:     General: Skin is warm and dry  Neurological:      Mental Status: She is alert  Mental status is at baseline  Psychiatric:         Mood and Affect: Mood normal          Behavior: Behavior normal                  Assessment     Sheron Hyman is a 43 y o  [id number] with normal gynecologic exam      Plan   1  Routine well woman exam done today  2   Pap and HPV:Pap with HPV was done today  Current ASCCP Guidelines reviewed  She was on her menses, pap smear collected, however patient is aware that it may be insufficient and she would need to return for another pap smear  We discussed the importance of staying on schedule with her pap smears, considering her prior LEEP and her family history  3   The patient accepted STD testing  Will follow up results  Safe sex practices have been discussed  4  The patient is sexually active  She previously had a tubal   5  We discussed her heavy menses and she has agreed to trying a Mirena IUD for symptomatic relief  Forms were signed today and the office will call her to schedule an apt for placement     5  The following were reviewed in today's visit: STD testing, use and side effects of OCPs, exercise, healthy diet and tobacco cessation  6  Patient to return to office for IUD placement once the office calls

## 2022-03-14 ENCOUNTER — OFFICE VISIT (OUTPATIENT)
Dept: OBGYN CLINIC | Facility: CLINIC | Age: 37
End: 2022-03-14

## 2022-03-14 VITALS
BODY MASS INDEX: 25.95 KG/M2 | HEIGHT: 62 IN | HEART RATE: 75 BPM | SYSTOLIC BLOOD PRESSURE: 117 MMHG | DIASTOLIC BLOOD PRESSURE: 77 MMHG | WEIGHT: 141 LBS

## 2022-03-14 DIAGNOSIS — Z01.419 WOMEN'S ANNUAL ROUTINE GYNECOLOGICAL EXAMINATION: Primary | ICD-10-CM

## 2022-03-14 PROCEDURE — 88141 CYTOPATH C/V INTERPRET: CPT | Performed by: SPECIALIST

## 2022-03-14 PROCEDURE — 99385 PREV VISIT NEW AGE 18-39: CPT | Performed by: OBSTETRICS & GYNECOLOGY

## 2022-03-14 PROCEDURE — G0476 HPV COMBO ASSAY CA SCREEN: HCPCS | Performed by: OBSTETRICS & GYNECOLOGY

## 2022-03-14 PROCEDURE — G0143 SCR C/V CYTO,THINLAYER,RESCR: HCPCS | Performed by: SPECIALIST

## 2022-03-14 NOTE — PATIENT INSTRUCTIONS

## 2022-03-15 ENCOUNTER — TELEPHONE (OUTPATIENT)
Dept: OBGYN CLINIC | Facility: CLINIC | Age: 37
End: 2022-03-15

## 2022-03-15 NOTE — TELEPHONE ENCOUNTER
Specialty pharmacy contacted to notify that patient no longer wants the Mirena  Order has not been processed & representative advised to call back tomorrow

## 2022-03-15 NOTE — TELEPHONE ENCOUNTER
Good Morning,    Patient was seen yesterday and signed the forms for the Mirena  Pt just called office informing that she is no longer interested in getting the Mirena and would prefer to take the pills  Please advise  Thank you!

## 2022-03-16 LAB
HPV HR 12 DNA CVX QL NAA+PROBE: POSITIVE
HPV16 DNA CVX QL NAA+PROBE: NEGATIVE
HPV18 DNA CVX QL NAA+PROBE: NEGATIVE

## 2022-03-21 ENCOUNTER — CLINICAL SUPPORT (OUTPATIENT)
Dept: OBGYN CLINIC | Facility: CLINIC | Age: 37
End: 2022-03-21

## 2022-03-21 DIAGNOSIS — Z23 NEED FOR HPV VACCINATION: Primary | ICD-10-CM

## 2022-03-21 PROCEDURE — 90471 IMMUNIZATION ADMIN: CPT

## 2022-03-21 PROCEDURE — 90651 9VHPV VACCINE 2/3 DOSE IM: CPT

## 2022-03-22 LAB
LAB AP GYN PRIMARY INTERPRETATION: ABNORMAL
Lab: ABNORMAL
PATH INTERP SPEC-IMP: ABNORMAL

## 2022-03-28 ENCOUNTER — TELEPHONE (OUTPATIENT)
Dept: OBGYN CLINIC | Facility: CLINIC | Age: 37
End: 2022-03-28

## 2022-03-28 NOTE — RESULT ENCOUNTER NOTE
I have tried to call the patient 4 times without success  Can you please contact her and inform her that her pap smear is abnormal and that she should be scheduled for a colposcopy (she has previously had one)  Thank you!

## 2022-03-29 NOTE — TELEPHONE ENCOUNTER
Good Morning,    Leland Whitehead called and asked if she could be given her results for her pap smear, she asked if a VM could be left with the result in case she is unable to answer the call  Thank you!

## 2022-04-05 ENCOUNTER — PROCEDURE VISIT (OUTPATIENT)
Dept: OBGYN CLINIC | Facility: CLINIC | Age: 37
End: 2022-04-05

## 2022-04-05 VITALS
BODY MASS INDEX: 26.5 KG/M2 | DIASTOLIC BLOOD PRESSURE: 75 MMHG | SYSTOLIC BLOOD PRESSURE: 114 MMHG | HEIGHT: 62 IN | HEART RATE: 77 BPM | WEIGHT: 144 LBS

## 2022-04-05 DIAGNOSIS — R87.613 PAP SMEAR OF CERVIX WITH HIGH GRADE SQUAMOUS INTRAEPITHELIAL LESION (HGSIL): Primary | ICD-10-CM

## 2022-04-05 DIAGNOSIS — R87.619: ICD-10-CM

## 2022-04-05 LAB — SL AMB POCT URINE HCG: NEGATIVE

## 2022-04-05 PROCEDURE — 88305 TISSUE EXAM BY PATHOLOGIST: CPT | Performed by: PATHOLOGY

## 2022-04-05 PROCEDURE — 81025 URINE PREGNANCY TEST: CPT | Performed by: OBSTETRICS & GYNECOLOGY

## 2022-04-05 PROCEDURE — 57455 BIOPSY OF CERVIX W/SCOPE: CPT | Performed by: OBSTETRICS & GYNECOLOGY

## 2022-04-05 PROCEDURE — 57500 BIOPSY OF CERVIX: CPT | Performed by: OBSTETRICS & GYNECOLOGY

## 2022-04-05 NOTE — PROGRESS NOTES
Colposcopy Visit     SUBJECTIVE:  CC:  HPI: Andreina Fong is a 43 y o  [id number] female who presents for colposcopy  Patient is s/p tubal ligation, she does not desire additional pregnancies  Reports 1 sexual partner in the last 6 months, no h/o STIs, declines screening at this time  Colposcopy    Date/Time: 4/5/2022 3:09 PM  Performed by: Nikko Stark MD  Authorized by: Gilberto Wong DO     Consent:     Consent obtained:  Verbal and written    Consent given by:  Patient    Procedural risks discussed:  Bleeding, damage to other organs, possible continued pain and repeat procedure    Patient questions answered: yes      Patient agrees, verbalizes understanding, and wants to proceed: yes      Instructions and paperwork completed: yes    Pre-procedure:     Prepped with: acetic acid    Indication:     Indication:  HSIL  Procedure:     Procedure: Colposcopy w/ biopsy of cervix      [place] speculum was placed in the vagina: yes      Under colposcopic examination the transition zone was seen in entirety: yes      Cervical biopsy performed with a cervical biopsy punch: yes ([facility]lers  ECC performed with Vooko )      Biopsy(s): yes      Location:  6 o'clock, 12 o'clock, ECC    Specimen to pathology: yes    Post-procedure:     Findings: Bleeding      Findings comment:  Silver Nitrate used to stay bleeding from 6 o'clock biopsy    Patient tolerance of procedure: Tolerated well, no immediate complications  Comments:      Minimal acetowhite changes on face of cervix; white tissue seen inside somewhat stenoic cervical os  Small dark blue-rubén lesion around 12 o'clock; this was biopsied        Past Medical History:   Diagnosis Date    Abnormal cells of cervix     Abnormal cytological finding in specimen from cervix 4/5/2022    ALVERTO III (cervical intraepithelial neoplasia III)     Headache     Varicella        Past Surgical History:   Procedure Laterality Date    AK COLPOSCOPY,CERVIX W/ADJ VAG,W/LOOP BX N/A 6/22/2017    Procedure: BIOPSY LEEP CERVIX;  Surgeon: Noah Pearson MD;  [facility];  Service: Gynecology    AK LAP,RMV  ADNEXAL STRUCTURE Bilateral 6/22/2017    Procedure: LAPAROSCOPIC SALPINGECTOMY;  Surgeon: Noah Pearson MD;  [facility];  Service: Gynecology       Social History     Tobacco Use    Smoking status: Former Smoker     Packs/day: 0 25    Smokeless tobacco: Never Used   Vaping Use    Vaping Use: Never used   Substance Use Topics    Alcohol use: Never    Drug use: Never     Comment: none         Current Outpatient Medications:     diphenhydrAMINE (BENADRYL) 25 mg tablet, Take 1 tablet (25 mg total) by mouth every 6 (six) hours (Patient not taking: Reported on 3/14/2022 ), Disp: 20 tablet, Rfl: 0    famotidine (PEPCID) 20 mg tablet, Take 1 tablet (20 mg total) by mouth 2 (two) times a day (Patient not taking: Reported on 3/14/2022 ), Disp: 30 tablet, Rfl: 0    predniSONE 10 mg tablet, 5 tabs po qd x 2 days then 4 tabs po qd x 2 days then 3 tabs po qd x 2 days then 2 tabs po qd x 2 days then 1 tab po qd x 2 days (Patient not taking: Reported on 3/14/2022 ), Disp: 30 tablet, Rfl: 0      OBJECTIVE:  Vitals:    04/05/22 1419   BP: 114/75   Pulse: 77   Weight: 65 3 kg (144 lb)   Height: 5' 2" (1 575 m)     Physical Exam  Constitutional:       General: She is not in acute distress  Appearance: Normal appearance  Cardiovascular:      Rate and Rhythm: Normal rate and regular rhythm  Heart sounds: Normal heart sounds  No murmur heard  No friction rub  No gallop  Pulmonary:      Effort: Pulmonary effort is normal  No respiratory distress  Breath sounds: Normal breath sounds  No stridor  No wheezing, rhonchi or rales  Musculoskeletal:         General: No swelling or tenderness  Skin:     Coloration: Skin is not pale  Findings: No rash  Neurological:      Mental Status: She is alert             ASSESSMENT/PLAN:  Problem List        Other    Pregnancy  premature rupture of membranes in third trimester     (spontaneous vaginal delivery)    Abnormal cytological finding in specimen from cervix    Overview     3/14/22: High grade squamous intraepithelial lesion, other HR HPV pos  17:  LEEP + ECC  - High grade squamous intraepithelial lesion (ALVERTO II-III) involving endocervical glands  - High grade dysplasia is present at margin of resection  - ALVERTO II-III inendocervix  3/1/17 colpo: ALVERTO 3  17: HSIL w/ HPV other pos     Pt did not present for follow-up care after LEEP in 2017 until annual in   Shayla Cárdenas is a 43 y o  [id number] female who presents for colposcopy; h/o ALVERTO II-III with high grade dysplais present in endocervical glands/margin of 2017 LEEP resection  Three biopsies taken today: 6 o'clock, 12 o'clock, and ECC  Will contact patient with results and plan for follow-up, wither with us or with Onc          Jes Montero MD   PGY-2, OBGYN  22 2:59 PM

## 2022-04-05 NOTE — ASSESSMENT & PLAN NOTE
6/22/17  LEEP + ECC :  - High grade squamous intraepithelial lesion (ALVERTO II-III) involving endocervical glands  - High grade dysplasia is present at margin of resection  - ALVERTO II-III inendocervix  3/1/17 colpo: ALVERTO 3  2/18/17: HSIL w/ HPV other pos

## 2022-04-27 ENCOUNTER — TELEPHONE (OUTPATIENT)
Dept: OBGYN CLINIC | Facility: CLINIC | Age: 37
End: 2022-04-27

## 2022-04-27 NOTE — TELEPHONE ENCOUNTER
Good Afternoon,     Patient called asking if her results came in for her Colposcopy she had done on 4/5/22  I tried to schedule her for results but she stated that cannot miss work and would like a call back to discuss results over the phone  She also stated if a call is placed to leave a message because in her work place she does not always hear her phone  Please advise  Thank you!

## 2022-05-09 NOTE — TELEPHONE ENCOUNTER
Good Afternoon,     Patient called again today regarding her Colposcopy result  She stated she is still waiting on the information and did not go to work today because she is still having a lot of pain and heavy bleeding and would like to know what the next step is  Please advise    Thank you!

## 2022-05-23 ENCOUNTER — CLINICAL SUPPORT (OUTPATIENT)
Dept: OBGYN CLINIC | Facility: CLINIC | Age: 37
End: 2022-05-23

## 2022-05-23 DIAGNOSIS — Z23 NEED FOR HPV VACCINE: Primary | ICD-10-CM

## 2022-05-23 PROCEDURE — 90651 9VHPV VACCINE 2/3 DOSE IM: CPT

## 2022-05-23 PROCEDURE — 90471 IMMUNIZATION ADMIN: CPT

## 2022-05-23 NOTE — TELEPHONE ENCOUNTER
Patient was in the office for her Gardasil injection, and would like to know the results of the colpo tissue exam

## 2022-05-25 ENCOUNTER — OFFICE VISIT (OUTPATIENT)
Dept: OBGYN CLINIC | Facility: CLINIC | Age: 37
End: 2022-05-25

## 2022-05-25 VITALS
SYSTOLIC BLOOD PRESSURE: 111 MMHG | HEART RATE: 72 BPM | BODY MASS INDEX: 28.01 KG/M2 | HEIGHT: 62 IN | WEIGHT: 152.2 LBS | DIASTOLIC BLOOD PRESSURE: 76 MMHG

## 2022-05-25 DIAGNOSIS — R87.619: ICD-10-CM

## 2022-05-25 DIAGNOSIS — N93.9 ABNORMAL UTERINE BLEEDING (AUB): Primary | ICD-10-CM

## 2022-05-25 PROCEDURE — 99213 OFFICE O/P EST LOW 20 MIN: CPT | Performed by: OBSTETRICS & GYNECOLOGY

## 2022-05-25 NOTE — ASSESSMENT & PLAN NOTE
Treatment options were discussed, including expectant management, hormonal (both combination and progesterone-only), Mirena, endometrial ablation  She is interested in a hysterectomy, but will start with more conservative management with an endometrial ablation  This was added to the consent form today and we discussed risks of pain, bleeding, infection, injury to surrounding organs  She will return for an endometrial biopsy to ensure that there is no occult uterine cancer prior to endometrial ablation  Advised to take Advil and Tylenol prior to endometrial biopsy

## 2022-05-25 NOTE — ASSESSMENT & PLAN NOTE
Consented for cold knife cone procedure   Discussed same day procedure, pain expectations, postpartum recovery    3/14/22: High grade squamous intraepithelial lesion, other HR HPV pos w/ positive ECC  6/22/17:  LEEP + ECC  - High grade squamous intraepithelial lesion (ALVERTO II-III) involving endocervical glands  - High grade dysplasia is present at margin of resection  - ALVERTO II-III inendocervix  3/1/17 colpo: ALVERTO 3  2/18/17: HSIL w/ HPV other pos

## 2022-05-25 NOTE — PROGRESS NOTES
OB/GYN VISIT  Raffi Jeffery  5/25/2022  5:36 PM    Subjective:     Raffi Jeffery is a 42 y o  [id number] female who presents for colpo results  Results as below  She also has been experiencing heavy uterine bleeding for the past few years  Her bleeding always starts the same time each month and she is changing a pad multiple times a day  She inquired about hysterectomy for her AUB  A   Cervix, 1:00, biopsy:  - Squamous mucosa with mild koilocytotic atypia      Note: In the setting of positive HPV test, the above findings could represent a low grade squamous intraepithelial lesion      B  Cervix, 6:00, biopsy:  - Low grade squamous intraepithelial lesion (LSIL)/cervical intraepithelial neoplasia-1 (ALVERTO-1)  - Separate detached fragment of high grade squamous intraepithelial lesion (HSIL)/cervical intraepithelial neoplasia-3 (ALVERTO-3)     C  Endocervix, curettage:  - High grade squamous intraepithelial lesion (HSIL)/cervical intraepithelial neoplasia-3 (ALVERTO-3)  - Separate fragment of benign endocervical glands        Past Medical History:   Diagnosis Date    Abnormal cells of cervix     Abnormal cytological finding in specimen from cervix 4/5/2022    ALVERTO III (cervical intraepithelial neoplasia III)     Headache     Varicella      Past Surgical History:   Procedure Laterality Date    PA COLPOSCOPY,CERVIX W/ADJ VAG,W/LOOP BX N/A 6/22/2017    Procedure: BIOPSY LEEP CERVIX;  Surgeon: Jay Troncoso MD;  [facility];  Service: Gynecology    PA LAP,RMV  ADNEXAL STRUCTURE Bilateral 6/22/2017    Procedure: LAPAROSCOPIC SALPINGECTOMY;  Surgeon: Jay Troncoso MD;  [facility];  Service: Gynecology       Objective:    Vitals: Blood pressure 111/76, pulse 72, height 5' 2" (1 575 m), weight 69 kg (152 lb 3 2 oz), last menstrual period 05/09/2022, not currently breastfeeding  Body mass index is 27 84 kg/m²        Assessment/Plan:  Problem List Items Addressed This Visit        Genitourinary    Abnormal uterine bleeding (AUB) - Primary     Treatment options were discussed, including expectant management, hormonal (both combination and progesterone-only), Mirena, endometrial ablation  She is interested in a hysterectomy, but will start with more conservative management with an endometrial ablation  This was added to the consent form today and we discussed risks of pain, bleeding, infection, injury to surrounding organs  She will return for an endometrial biopsy to ensure that there is no occult uterine cancer prior to endometrial ablation  Advised to take Advil and Tylenol prior to endometrial biopsy  Other    Abnormal cytological finding in specimen from cervix     Consented for cold knife cone procedure  Discussed same day procedure, pain expectations, postpartum recovery    3/14/22: High grade squamous intraepithelial lesion, other HR HPV pos w/ positive ECC  6/22/17:  LEEP + ECC  - High grade squamous intraepithelial lesion (ALVERTO II-III) involving endocervical glands  - High grade dysplasia is present at margin of resection  - ALVERTO II-III inendocervix  3/1/17 colpo: ALVERTO 3  2/18/17: HSIL w/ HPV other pos                  Patient to receive a call from surgery scheduler Glendy Brown with options for surgical timing  She understands that her case will be reviewed by the surgical review committee prior to being scheduled       D/w Dr Usha Camarillo DO  5/25/2022  5:36 PM

## 2022-06-01 ENCOUNTER — TELEPHONE (OUTPATIENT)
Dept: OTHER | Facility: HOSPITAL | Age: 37
End: 2022-06-01

## 2022-06-01 NOTE — TELEPHONE ENCOUNTER
GYN CASE REVIEW    Jenniffer Turner is a 38y/o with ALVERTO 3 on recent colposcopy  She has been consented for cold knife cone procedure  She should have an pelvic exam done at her next visit to document adequate cervical stroma for CKC procedure  Patient also had complaints of AUB at the time of her last visit and endometrial ablation at the time of the surgery was discussed  Given that the extent of her cervical pathology is unknown at this time, she is not approved to have an endometrial ablation at the time of her CKC  At her next visit, hysteroscopy D&C at the time of the CKC should be discussed as part of the work up for AUB  She could also have Mirena IUD placed at the time of the procedure for the treatment of AUB if she desires although given many year history of CIN2-3, hysterectomy is not an unreasonable option following CKC  She should also have the following for AUB work up:    - TSH  - CBC  - Pelvic ultrasound    The CKC has been approved but the patient will need further discussion in the office regarding AUB work up prior to scheduling the procedure       Haydee Garcia MD, PGY-4  6/1/2022  5:54 PM

## 2022-06-02 ENCOUNTER — PROCEDURE VISIT (OUTPATIENT)
Dept: OBGYN CLINIC | Facility: CLINIC | Age: 37
End: 2022-06-02

## 2022-06-02 VITALS
HEART RATE: 58 BPM | BODY MASS INDEX: 27.64 KG/M2 | SYSTOLIC BLOOD PRESSURE: 105 MMHG | HEIGHT: 62 IN | DIASTOLIC BLOOD PRESSURE: 72 MMHG | WEIGHT: 150.2 LBS

## 2022-06-02 DIAGNOSIS — N93.9 ABNORMAL UTERINE BLEEDING (AUB): Primary | ICD-10-CM

## 2022-06-02 LAB — SL AMB POCT URINE HCG: NORMAL

## 2022-06-02 PROCEDURE — 81025 URINE PREGNANCY TEST: CPT | Performed by: STUDENT IN AN ORGANIZED HEALTH CARE EDUCATION/TRAINING PROGRAM

## 2022-06-02 PROCEDURE — 99213 OFFICE O/P EST LOW 20 MIN: CPT | Performed by: STUDENT IN AN ORGANIZED HEALTH CARE EDUCATION/TRAINING PROGRAM

## 2022-06-02 NOTE — PROGRESS NOTES
Endometrial biopsy    Date/Time: 6/2/2022 9:29 AM  Performed by: Robles Waterman MD  Authorized by: Robles Waterman MD

## 2022-06-03 ENCOUNTER — HOSPITAL ENCOUNTER (OUTPATIENT)
Dept: RADIOLOGY | Facility: HOSPITAL | Age: 37
Discharge: HOME | End: 2022-06-03
Payer: COMMERCIAL

## 2022-06-03 ENCOUNTER — LAB (OUTPATIENT)
Dept: LAB | Facility: HOSPITAL | Age: 37
End: 2022-06-03
Payer: COMMERCIAL

## 2022-06-03 DIAGNOSIS — Z01.419 WOMEN'S ANNUAL ROUTINE GYNECOLOGICAL EXAMINATION: ICD-10-CM

## 2022-06-03 DIAGNOSIS — N93.9 ABNORMAL UTERINE BLEEDING (AUB): ICD-10-CM

## 2022-06-03 LAB
C TRACH DNA SPEC QL NAA+PROBE: NEGATIVE
ERYTHROCYTE [DISTWIDTH] IN BLOOD BY AUTOMATED COUNT: 13.9 % (ref 11.6–15.1)
HBV CORE IGM SER QL: NORMAL
HCT VFR BLD AUTO: 33.5 % (ref 34.8–46.1)
HCV AB SER QL: NORMAL
HGB BLD-MCNC: 10.8 G/DL (ref 11.5–15.4)
MCH RBC QN AUTO: 27.4 PG (ref 26.8–34.3)
MCHC RBC AUTO-ENTMCNC: 32.2 G/DL (ref 31.4–37.4)
MCV RBC AUTO: 85 FL (ref 82–98)
N GONORRHOEA DNA SPEC QL NAA+PROBE: NEGATIVE
PLATELET # BLD AUTO: 405 THOUSANDS/UL (ref 149–390)
PMV BLD AUTO: 10.1 FL (ref 8.9–12.7)
RBC # BLD AUTO: 3.94 MILLION/UL (ref 3.81–5.12)
TSH SERPL DL<=0.05 MIU/L-ACNC: 0.73 UIU/ML (ref 0.45–4.5)
WBC # BLD AUTO: 4.78 THOUSAND/UL (ref 4.31–10.16)

## 2022-06-03 PROCEDURE — 36415 COLL VENOUS BLD VENIPUNCTURE: CPT

## 2022-06-03 PROCEDURE — 85027 COMPLETE CBC AUTOMATED: CPT

## 2022-06-03 PROCEDURE — 86592 SYPHILIS TEST NON-TREP QUAL: CPT

## 2022-06-03 PROCEDURE — 87591 N.GONORRHOEAE DNA AMP PROB: CPT

## 2022-06-03 PROCEDURE — 87491 CHLMYD TRACH DNA AMP PROBE: CPT

## 2022-06-03 PROCEDURE — 84443 ASSAY THYROID STIM HORMONE: CPT

## 2022-06-03 PROCEDURE — 86705 HEP B CORE ANTIBODY IGM: CPT

## 2022-06-03 PROCEDURE — 86803 HEPATITIS C AB TEST: CPT

## 2022-06-03 PROCEDURE — 76856 US EXAM PELVIC COMPLETE: CPT

## 2022-06-03 PROCEDURE — 76830 TRANSVAGINAL US NON-OB: CPT

## 2022-06-03 PROCEDURE — 87389 HIV-1 AG W/HIV-1&-2 AB AG IA: CPT

## 2022-06-05 LAB — HIV 1+2 AB+HIV1 P24 AG SERPL QL IA: NORMAL

## 2022-06-06 LAB — RPR SER QL: NORMAL

## 2022-06-07 NOTE — PROGRESS NOTES
H&P Exam - Gynecology   Carey Jeffers 42 y o  female MRN: [id number]  Unit/Bed#:  Encounter: [id number]      History of Present Illness     HPI:  Carey Jeffers is a 42 y o  female who presents for a preoperative visit prior to her cold knife cone for ALVERTO 3  Patient has a history of a LEEP with persistent ALVERTO 2-3 as well as prior documented abnormal uterine bleeding (see prior notes for further details)  PSH included bilateral salpingectomy for sterilization  Review of Systems   Constitutional: Negative for chills and fever  Respiratory: Negative for shortness of breath and wheezing  Cardiovascular: Negative for chest pain  Gastrointestinal: Negative for abdominal pain  Genitourinary: Negative for dysuria, pelvic pain, vaginal bleeding and vaginal discharge  Neurological: Negative for dizziness, light-headedness and headaches         Historical Information   Past Medical History:   Diagnosis Date    Abnormal cells of cervix     Abnormal cytological finding in specimen from cervix 4/5/2022    ALVERTO III (cervical intraepithelial neoplasia III)     Headache     Varicella      Past Surgical History:   Procedure Laterality Date    CA COLPOSCOPY,CERVIX W/ADJ VAG,W/LOOP BX N/A 6/22/2017    Procedure: BIOPSY LEEP CERVIX;  Surgeon: Natalee Jiang MD;  [facility];  Service: Gynecology    CA LAP,RMV  ADNEXAL STRUCTURE Bilateral 6/22/2017    Procedure: LAPAROSCOPIC SALPINGECTOMY;  Surgeon: Natalee Jiang MD;  [facility];  Service: Gynecology     OB/GYN History:   Family History   Problem Relation Age of Onset    No Known Problems Mother     No Known Problems Father     No Known Problems Sister     No Known Problems Brother     No Known Problems Daughter     No Known Problems Son     No Known Problems Maternal Aunt     No Known Problems Maternal Uncle     No Known Problems Paternal Aunt     No Known Problems Paternal Uncle     No Known Problems Maternal Grandmother     No Known Problems Maternal Grandfather     No Known Problems Paternal Grandmother     No Known Problems Paternal Grandfather      Social History   Social History     Substance and Sexual Activity   Alcohol Use Never     Social History     Substance and Sexual Activity   Drug Use Never    Comment: none     Social History     Tobacco Use   Smoking Status Former Smoker    Packs/day: 0 25   Smokeless Tobacco Never Used       Meds/Allergies   (Not in a hospital admission)    No Known Allergies    Objective   /72   Pulse 58   Ht 5' 2" (1 575 m)   Wt 68 1 kg (150 lb 3 2 oz)   LMP 05/09/2022 (Approximate)   BMI 27 47 kg/m²         Physical Exam  Constitutional:       General: She is not in acute distress  Appearance: She is not ill-appearing or toxic-appearing  HENT:      Head: Normocephalic and atraumatic  Cardiovascular:      Rate and Rhythm: Normal rate and regular rhythm  Heart sounds: Normal heart sounds  Pulmonary:      Effort: Pulmonary effort is normal  No respiratory distress  Breath sounds: Normal breath sounds  No wheezing  Abdominal:      Palpations: Abdomen is soft  Tenderness: There is no abdominal tenderness  Genitourinary:     General: Normal vulva  Vagina: No vaginal discharge  Comments: Speculum exam revealed a normal appearing cervix with adequate cervical tissue for a CKC  Skin:     General: Skin is warm and dry  Neurological:      General: No focal deficit present  Mental Status: She is alert and oriented to person, place, and time  Psychiatric:         Mood and Affect: Mood normal          Thought Content: Thought content normal          Judgment: Judgment normal          Lab Results:   Procedure visit on 06/02/2022   Component Date Value    URINE HCG 06/02/2022 neg         Assessment/Plan     A/P: Patient is a 43 yo who presents for pre-op H&P for ALVERTO 3  She has previously been consented for a cold knife cone procedure   Additionally, patient has a history of abnormal uterine bleeding and has not had an EMB  We discussed potential for hysterectomy in the future given history of persistent ALVERTO 2-3 with abnormal uterine bleeding and patient highly desires ultimate surgical management with hysterectomy  She declines Mirena IUD or uterine ablation for AUB  The consent was adjusted today to add on a hysteroscopy D&C in addition to the CKC  Patient will go for a TVUS, CBC, and TSH prior to surgery       - Consent given to surgical coordinator         Plan discussed with Dr Dina Mendosa MD  6/7/2022  6:16 PM

## 2022-06-10 ENCOUNTER — PREP FOR PROCEDURE (OUTPATIENT)
Dept: OBGYN CLINIC | Facility: CLINIC | Age: 37
End: 2022-06-10

## 2022-06-10 DIAGNOSIS — R87.613 HSIL (HIGH GRADE SQUAMOUS INTRAEPITHELIAL LESION) ON PAP SMEAR OF CERVIX: ICD-10-CM

## 2022-06-10 DIAGNOSIS — N93.9 ABNORMAL UTERINE BLEEDING (AUB): Primary | ICD-10-CM

## 2022-07-05 ENCOUNTER — ANESTHESIA EVENT (OUTPATIENT)
Dept: PERIOP | Facility: HOSPITAL | Age: 37
End: 2022-07-05
Payer: COMMERCIAL

## 2022-07-05 NOTE — PRE-PROCEDURE INSTRUCTIONS
No outpatient medications have been marked as taking for the 7/8/22 encounter [facility] Encounter)  Per patient, no OTC or prescribed medications  Reviewed with patient, in detail, instructions from "My Surgical Experience"  Instructed to avoid all  OTC vitamins/supplements and NSAIDS from now until after surgery per anesthesia guidelines  Tylenol ok to take PRN  Advised patient that Isra Swain will call with surgery arrival time and hospital directions the business day prior to surgery  Advised patient nothing eat or drink after midnight prior to surgery  Instructed to call surgeon's office in meantime with any new illnesses/exposure  Patient verbalized understanding of current visitor restrictions/masking guidelines and advised that he/she can confirm these at time of arrival call with Isra Swain  Patient verbalized understanding and knows to call surgeon's office with any additional questions prior to surgery

## 2022-07-08 ENCOUNTER — ANESTHESIA (OUTPATIENT)
Dept: PERIOP | Facility: HOSPITAL | Age: 37
End: 2022-07-08
Payer: COMMERCIAL

## 2022-08-12 ENCOUNTER — HOSPITAL ENCOUNTER (OUTPATIENT)
Facility: HOSPITAL | Age: 37
Setting detail: OUTPATIENT SURGERY
Discharge: HOME | End: 2022-08-12
Attending: OBSTETRICS & GYNECOLOGY | Admitting: OBSTETRICS & GYNECOLOGY
Payer: COMMERCIAL

## 2022-08-12 VITALS
HEART RATE: 65 BPM | DIASTOLIC BLOOD PRESSURE: 68 MMHG | TEMPERATURE: 97.6 F | SYSTOLIC BLOOD PRESSURE: 104 MMHG | WEIGHT: 150 LBS | HEIGHT: 62 IN | RESPIRATION RATE: 14 BRPM | BODY MASS INDEX: 27.6 KG/M2 | OXYGEN SATURATION: 100 %

## 2022-08-12 DIAGNOSIS — R87.613 HSIL (HIGH GRADE SQUAMOUS INTRAEPITHELIAL LESION) ON PAP SMEAR OF CERVIX: ICD-10-CM

## 2022-08-12 DIAGNOSIS — N93.9 ABNORMAL UTERINE BLEEDING (AUB): ICD-10-CM

## 2022-08-12 LAB
EXT PREGNANCY TEST URINE: NEGATIVE
EXT. CONTROL: NORMAL

## 2022-08-12 PROCEDURE — 57520 CONIZATION OF CERVIX: CPT | Performed by: OBSTETRICS & GYNECOLOGY

## 2022-08-12 PROCEDURE — 88305 TISSUE EXAM BY PATHOLOGIST: CPT | Performed by: PATHOLOGY

## 2022-08-12 PROCEDURE — 88307 TISSUE EXAM BY PATHOLOGIST: CPT | Performed by: PATHOLOGY

## 2022-08-12 PROCEDURE — NC001 PR NO CHARGE: Performed by: OBSTETRICS & GYNECOLOGY

## 2022-08-12 PROCEDURE — 81025 URINE PREGNANCY TEST: CPT | Performed by: OBSTETRICS & GYNECOLOGY

## 2022-08-12 PROCEDURE — 58558 HYSTEROSCOPY BIOPSY: CPT | Performed by: OBSTETRICS & GYNECOLOGY

## 2022-08-12 RX ORDER — MIDAZOLAM HYDROCHLORIDE 2 MG/2ML
INJECTION, SOLUTION INTRAMUSCULAR; INTRAVENOUS AS NEEDED
Status: DISCONTINUED | OUTPATIENT
Start: 2022-08-12 | End: 2022-08-12

## 2022-08-12 RX ORDER — PROPOFOL 10 MG/ML
INJECTION, EMULSION INTRAVENOUS AS NEEDED
Status: DISCONTINUED | OUTPATIENT
Start: 2022-08-12 | End: 2022-08-12

## 2022-08-12 RX ORDER — HYDROMORPHONE HCL/PF 1 MG/ML
0.5 SYRINGE (ML) INJECTION
Status: DISCONTINUED | OUTPATIENT
Start: 2022-08-12 | End: 2022-08-12 | Stop reason: HOSPADM

## 2022-08-12 RX ORDER — EPHEDRINE SULFATE 50 MG/ML
INJECTION INTRAVENOUS AS NEEDED
Status: DISCONTINUED | OUTPATIENT
Start: 2022-08-12 | End: 2022-08-12

## 2022-08-12 RX ORDER — KETOROLAC TROMETHAMINE 30 MG/ML
INJECTION, SOLUTION INTRAMUSCULAR; INTRAVENOUS AS NEEDED
Status: DISCONTINUED | OUTPATIENT
Start: 2022-08-12 | End: 2022-08-12

## 2022-08-12 RX ORDER — SODIUM CHLORIDE, SODIUM LACTATE, POTASSIUM CHLORIDE, CALCIUM CHLORIDE 600; 310; 30; 20 MG/100ML; MG/100ML; MG/100ML; MG/100ML
INJECTION, SOLUTION INTRAVENOUS CONTINUOUS PRN
Status: DISCONTINUED | OUTPATIENT
Start: 2022-08-12 | End: 2022-08-12

## 2022-08-12 RX ORDER — IODINE SOLUTION STRONG 5% (LUGOL'S) 5 %
SOLUTION ORAL AS NEEDED
Status: DISCONTINUED | OUTPATIENT
Start: 2022-08-12 | End: 2022-08-12 | Stop reason: HOSPADM

## 2022-08-12 RX ORDER — FENTANYL CITRATE/PF 50 MCG/ML
25 SYRINGE (ML) INJECTION
Status: DISCONTINUED | OUTPATIENT
Start: 2022-08-12 | End: 2022-08-12 | Stop reason: HOSPADM

## 2022-08-12 RX ORDER — MAGNESIUM HYDROXIDE 1200 MG/15ML
LIQUID ORAL AS NEEDED
Status: DISCONTINUED | OUTPATIENT
Start: 2022-08-12 | End: 2022-08-12 | Stop reason: HOSPADM

## 2022-08-12 RX ORDER — DEXAMETHASONE SODIUM PHOSPHATE 10 MG/ML
INJECTION, SOLUTION INTRAMUSCULAR; INTRAVENOUS AS NEEDED
Status: DISCONTINUED | OUTPATIENT
Start: 2022-08-12 | End: 2022-08-12

## 2022-08-12 RX ORDER — IBUPROFEN 600 MG/1
600 TABLET ORAL EVERY 6 HOURS PRN
Status: CANCELLED | OUTPATIENT
Start: 2022-08-12

## 2022-08-12 RX ORDER — FENTANYL CITRATE 50 UG/ML
INJECTION, SOLUTION INTRAMUSCULAR; INTRAVENOUS AS NEEDED
Status: DISCONTINUED | OUTPATIENT
Start: 2022-08-12 | End: 2022-08-12

## 2022-08-12 RX ORDER — ONDANSETRON 2 MG/ML
4 INJECTION INTRAMUSCULAR; INTRAVENOUS ONCE AS NEEDED
Status: DISCONTINUED | OUTPATIENT
Start: 2022-08-12 | End: 2022-08-12 | Stop reason: HOSPADM

## 2022-08-12 RX ORDER — LIDOCAINE HYDROCHLORIDE 10 MG/ML
0.5 INJECTION, SOLUTION EPIDURAL; INFILTRATION; INTRACAUDAL; PERINEURAL ONCE
Status: COMPLETED | OUTPATIENT
Start: 2022-08-12 | End: 2022-08-12

## 2022-08-12 RX ORDER — SODIUM CHLORIDE, SODIUM LACTATE, POTASSIUM CHLORIDE, CALCIUM CHLORIDE 600; 310; 30; 20 MG/100ML; MG/100ML; MG/100ML; MG/100ML
125 INJECTION, SOLUTION INTRAVENOUS CONTINUOUS
Status: DISCONTINUED | OUTPATIENT
Start: 2022-08-12 | End: 2022-08-12 | Stop reason: HOSPADM

## 2022-08-12 RX ORDER — ONDANSETRON 2 MG/ML
INJECTION INTRAMUSCULAR; INTRAVENOUS AS NEEDED
Status: DISCONTINUED | OUTPATIENT
Start: 2022-08-12 | End: 2022-08-12

## 2022-08-12 RX ORDER — LIDOCAINE HYDROCHLORIDE 10 MG/ML
INJECTION, SOLUTION EPIDURAL; INFILTRATION; INTRACAUDAL; PERINEURAL AS NEEDED
Status: DISCONTINUED | OUTPATIENT
Start: 2022-08-12 | End: 2022-08-12

## 2022-08-12 RX ORDER — ACETAMINOPHEN 325 MG/1
975 TABLET ORAL EVERY 6 HOURS PRN
Status: CANCELLED | OUTPATIENT
Start: 2022-08-12

## 2022-08-12 RX ADMIN — Medication 25 MCG: at 14:20

## 2022-08-12 RX ADMIN — DEXAMETHASONE SODIUM PHOSPHATE 8 MG: 10 INJECTION, SOLUTION INTRAMUSCULAR; INTRAVENOUS at 12:49

## 2022-08-12 RX ADMIN — LIDOCAINE HYDROCHLORIDE 50 MG: 10 INJECTION, SOLUTION EPIDURAL; INFILTRATION; INTRACAUDAL at 12:44

## 2022-08-12 RX ADMIN — Medication 25 MCG: at 14:36

## 2022-08-12 RX ADMIN — EPHEDRINE SULFATE 5 MG: 50 INJECTION INTRAVENOUS at 13:13

## 2022-08-12 RX ADMIN — FENTANYL CITRATE 25 MCG: 50 INJECTION INTRAMUSCULAR; INTRAVENOUS at 13:32

## 2022-08-12 RX ADMIN — KETOROLAC TROMETHAMINE 30 MG: 30 INJECTION, SOLUTION INTRAMUSCULAR; INTRAVENOUS at 13:57

## 2022-08-12 RX ADMIN — FENTANYL CITRATE 50 MCG: 50 INJECTION INTRAMUSCULAR; INTRAVENOUS at 12:47

## 2022-08-12 RX ADMIN — SODIUM CHLORIDE, SODIUM LACTATE, POTASSIUM CHLORIDE, AND CALCIUM CHLORIDE: .6; .31; .03; .02 INJECTION, SOLUTION INTRAVENOUS at 14:01

## 2022-08-12 RX ADMIN — SODIUM CHLORIDE, SODIUM LACTATE, POTASSIUM CHLORIDE, AND CALCIUM CHLORIDE: .6; .31; .03; .02 INJECTION, SOLUTION INTRAVENOUS at 12:37

## 2022-08-12 RX ADMIN — MIDAZOLAM 2 MG: 1 INJECTION INTRAMUSCULAR; INTRAVENOUS at 12:35

## 2022-08-12 RX ADMIN — PROPOFOL 200 MG: 10 INJECTION, EMULSION INTRAVENOUS at 12:44

## 2022-08-12 RX ADMIN — EPHEDRINE SULFATE 5 MG: 50 INJECTION INTRAVENOUS at 13:04

## 2022-08-12 RX ADMIN — SODIUM CHLORIDE, SODIUM LACTATE, POTASSIUM CHLORIDE, AND CALCIUM CHLORIDE 125 ML/HR: .6; .31; .03; .02 INJECTION, SOLUTION INTRAVENOUS at 11:50

## 2022-08-12 RX ADMIN — Medication 25 MCG: at 14:27

## 2022-08-12 RX ADMIN — FENTANYL CITRATE 25 MCG: 50 INJECTION INTRAMUSCULAR; INTRAVENOUS at 13:09

## 2022-08-12 RX ADMIN — LIDOCAINE HYDROCHLORIDE 0.5 ML: 10 INJECTION, SOLUTION EPIDURAL; INFILTRATION; INTRACAUDAL; PERINEURAL at 11:50

## 2022-08-12 RX ADMIN — Medication 25 MCG: at 14:17

## 2022-08-12 RX ADMIN — ONDANSETRON HYDROCHLORIDE 4 MG: 2 INJECTION, SOLUTION INTRAMUSCULAR; INTRAVENOUS at 12:49

## 2022-08-12 NOTE — ANESTHESIA PREPROCEDURE EVALUATION
Procedure:  BIOPSY CONE/COLD KNIFE CERVIX (Bilateral Cervix)  DILATATION AND CURETTAGE (D&C) WITH HYSTEROSCOPY (N/A Uterus)    Relevant Problems   Genitourinary   (+) Abnormal uterine bleeding (AUB)      Lab Results   Component Value Date    WBC 4 78 06/03/2022    HGB 10 8 (L) 06/03/2022    HCT 33 5 (L) 06/03/2022    MCV 85 06/03/2022     (H) 06/03/2022     Type and Screen:  A    Physical Exam    Airway    Mallampati score: I  TM Distance: >3 FB  Neck ROM: full     Dental       Cardiovascular      Pulmonary      Other Findings        Anesthesia Plan  ASA Score- 1     Anesthesia Type- general with ASA Monitors  Additional Monitors:   Airway Plan: LMA  Plan Factors-Exercise tolerance (METS): >4 METS  Chart reviewed  Existing labs reviewed  Patient summary reviewed  Induction- intravenous  Postoperative Plan- Plan for postoperative opioid use  Planned trial extubation    Informed Consent- Anesthetic plan and risks discussed with patient  I personally reviewed this patient with the CRNA  Discussed and agreed on the Anesthesia Plan with the CRNA  Leilani Servin

## 2022-08-12 NOTE — H&P
H&P Exam - Gynecology   Madelyn Hargrove 42 y o  female MRN: [id number]  Unit/Bed#:  Encounter: [id number]        History of Present Illness            HPI:  Madelyn Hargrove is a 42 y o  female who presents for a cold knife cone for ALVERTO 3  Patient has a history of a LEEP with persistent ALVERTO 2-3 as well as prior documented abnormal uterine bleeding (see prior notes for further details)  PSH included bilateral salpingectomy for sterilization       Review of Systems   Constitutional: Negative for chills and fever  Respiratory: Negative for shortness of breath and wheezing  Cardiovascular: Negative for chest pain  Gastrointestinal: Negative for abdominal pain  Genitourinary: Negative for dysuria, pelvic pain, vaginal bleeding and vaginal discharge     Neurological: Negative for dizziness, light-headedness and headaches          Historical Information         Medical History        Past Medical History:   Diagnosis Date    Abnormal cells of cervix      Abnormal cytological finding in specimen from cervix 4/5/2022    ALVERTO III (cervical intraepithelial neoplasia III)      Headache      Varicella           Surgical History         Past Surgical History:   Procedure Laterality Date    DC COLPOSCOPY,CERVIX W/ADJ VAG,W/LOOP BX N/A 6/22/2017     Procedure: BIOPSY LEEP CERVIX;  Surgeon: Mendel Rao, MD;  [facility];  Service: Gynecology    DC LAP,RMV  ADNEXAL STRUCTURE Bilateral 6/22/2017     Procedure: LAPAROSCOPIC SALPINGECTOMY;  Surgeon: Mendel Rao, MD;  [facility];  Service: Gynecology         OB/GYN History:         Family History   Problem Relation Age of Onset    No Known Problems Mother      No Known Problems Father      No Known Problems Sister      No Known Problems Brother      No Known Problems Daughter      No Known Problems Son      No Known Problems Maternal Aunt      No Known Problems Maternal Uncle      No Known Problems Paternal Aunt      No Known Problems Paternal Uncle    No Known Problems Maternal Grandmother      No Known Problems Maternal Grandfather      No Known Problems Paternal Grandmother      No Known Problems Paternal Grandfather        Social History         Social History          Substance and Sexual Activity   Alcohol Use Never      Social History           Substance and Sexual Activity   Drug Use Never     Comment: none      Social History           Tobacco Use   Smoking Status Former Smoker    Packs/day: 0 25   Smokeless Tobacco Never Used         Meds/Allergies     Prescriptions Prior to Admission   (Not in a hospital admission)      No Known Allergies     Objective   Systolic (58KHN), BNC:397 , Min:120 , BQH:973    Diastolic (17ZQH), ISO:29, Min:74, Max:74          Physical Exam  Constitutional:       General: She is not in acute distress  Appearance: She is not ill-appearing or toxic-appearing  HENT:      Head: Normocephalic and atraumatic  Cardiovascular:      Rate and Rhythm: Normal rate and regular rhythm  Heart sounds: Normal heart sounds  Pulmonary:      Effort: Pulmonary effort is normal  No respiratory distress  Breath sounds: Normal breath sounds  No wheezing  Abdominal:      Palpations: Abdomen is soft  Tenderness: There is no abdominal tenderness  Skin:     General: Skin is warm and dry  Neurological:      General: No focal deficit present  Mental Status: She is alert and oriented to person, place, and time  Psychiatric:         Mood and Affect: Mood normal          Thought Content: Thought content normal          Judgment: Judgment normal             Lab Results:         Procedure visit on 12/12/2022   Component Date Value    URINE HCG 08/12/2022 neg          Assessment/Plan      A/P: Patient is a 43 yo who presents for pre-op H&P for ALVERTO 3  She has previously been consented for a cold knife cone procedure  Additionally, patient has a history of abnormal uterine bleeding and has not had an EMB   We discussed potential for hysterectomy in the future given history of persistent ALVERTO 2-3 with abnormal uterine bleeding and patient highly desires ultimate surgical management with hysterectomy  She declines Mirena IUD or uterine ablation for AUB   The consent was adjusted  to add on a hysteroscopy D&C in addition to the CKC         Discussed with Dr Sharon Cho MD  PGY-1  OBGYN

## 2022-08-12 NOTE — OP NOTE
OPERATIVE REPORT  PATIENT NAME: Hang Han    :  1985  MRN: [id number]  Pt Location: [facility] OR ROOM 06    SURGERY DATE: 2022    Surgeon(s) and Role:     * Lico Gabriel MD - Primary     * Oren Stanton MD - Ismael [address] Christine Dixon MD - Assisting    Preop Diagnosis:  *Abnormal uterine bleeding (AUB) [N93 9]  HSIL (high grade squamous intraepithelial lesion) on Pap smear of cervix [R87 613]  *Positive ECC    Post-Op Diagnosis Codes:     * Abnormal uterine bleeding (AUB) [N93 9]     * HSIL (high grade squamous intraepithelial lesion) on       Pap smear of cervix [R87 613]     * Positive ECC     * Possible submucosal fibroid, anterior uterine wall     * Rectocele     * Cystocele    Procedure(s) (LRB):  BIOPSY CONE/COLD KNIFE CERVIX (Bilateral)  DILATATION AND CURETTAGE (D&C) WITH HYSTEROSCOPY (N/A)    Specimen(s):  ID Type Source Tests Collected by Time Destination   1 : suture 1200 Tissue Cervix TISSUE EXAM Lico Gabriel MD 2022 1300    2 :  Tissue Cervix, Endocervical TISSUE EXAM Lico Gabriel MD 2022 1300    3 :  Tissue Endometrium TISSUE EXAM Lico Gabriel MD 2022 1301        Estimated Blood Loss:   Minimal    Drains:  * No LDAs found *    Anesthesia Type:    General LMA    Operative Findings:  Grossly normal external genitalia  Rectocele  Cystocele  Grade 1 Uterine decensus  Possible submucosal fibroid on anterior uterine wall      Procedure (OR)  The patient was correctly identified as and taken to the OR where general LMA anesthesia was obtained without difficulty   She was placed in the dorsal lithotomy position and was prepped and draped in a sterile fashion    The pt was examined under anesthesia and was found to have a rectocele, cystocele and [anteverted] uterus with normal adnexa   The bladder was emptied with a straight catheter and 50cc of clear yellow urine were obtained      A weighted speculum and jeronimo was inserted into the vagina and the anterior lip of the cervix was visualized and grasped with a double toothed tenaculum  Stay sutures were used at 3 o'clock and 9 o'clock of the cervix to improve hemostatis  Lugol's iodine was then applied to the cervix and nonstaining areas were noted at 6'oclock  An 11 blade was used to excise the specimen starting at the 1:00 position and rotating 360°  The specimen was grasped with an adson clamp and the base was detached using 11 blade  The gross specimen was removed, tagged at 12:00 position, and sent to pathology  Locked running sutures were used circumferentially to improve hemostatis  Endocervical curettage was completed [facility], samples were collected on Telfa, and sent to pathology  Uterus was sounded to 10cm  Using narayan dilators, the cervix was progressively dilated to about 29mm       Attention was turned to the uterus  A 5 mm 0 degree Hysteroscope was inserted  The entire endometrial cavity was visualized as well as right ostia  Multiple polypoid masses and a possible submucosal fibroid, were visualized on the anterior wall of the urterus  Hysteroscope was removed  The uterine cavity was systematically curetted with a sharp curette and the curettings sent for analysis      Attention was turned back to the cervix, a ball electrocautery was used to coagulate the cut surfaces  Monsel solution was then applied to maintain hemostasis  Good hemostasis was confirmed  The tenaculum and retractor were removed  There was no bleeding noted from tenaculum site  The patient tolerated the procedure well  Sponge and instrument count were correct x 3      Complications:   None    Dr Debi Campbell was present for the entirety of the procedure       Patient Disposition:  PACU       SIGNATURE: Conner Smith MD  DATE: August 12, 2022  TIME: 2:10 PM

## 2022-08-12 NOTE — ANESTHESIA POSTPROCEDURE EVALUATION
Post-Op Assessment Note    CV Status:  Stable  Pain Score: 0    Pain management: adequate     Mental Status:  Awake   Hydration Status:  Euvolemic   PONV Controlled:  Controlled   Airway Patency:  Patent      Post Op Vitals Reviewed: Yes      Staff: CRNA         No complications documented      BP   97/61   Temp 98 6 °F (37 °C) (08/12/22 1408)    Pulse  70   Resp   16   SpO2   100% ra

## 2022-09-06 NOTE — PROGRESS NOTES
OB/GYN VISIT  Jeovany Officer  9/8/2022  3:14 PM    Subjective:     Jeovany Officer is a 42 y o  [id number] female who [facility]  She has been having some bleeding since the procedure which was spotting initially, and then she got her menstrual cycle where she passed one large clot and it was heavier than usual  She has recovered well to date, and would really like to move forward with scheduling a hysterectomy for AUB  A  Cervix, excision:  - Extensive high grade squamous intraepithelial lesion (HSIL / ALVERTO 3) with focal endocervical gland involvement  - Margins uninvolved by dysplasia  - Negative for invasive carcinoma       B  Endocervical, bx:  - Ecto- and endocervical type mucosa with no significant histopathologic abnormality   - Proliferative endometrium   - Negative for dysplasia and malignancy       C  Endometrium, bx:  - Disordered proliferative endometrium   - Negative for atypia and malignancy        Past Medical History:   Diagnosis Date    Abnormal cells of cervix     Abnormal cytological finding in specimen from cervix 4/5/2022    ALVERTO III (cervical intraepithelial neoplasia III)     Headache     Varicella      Past Surgical History:   Procedure Laterality Date    CERVICAL BIOPSY Bilateral 8/12/2022    Procedure: BIOPSY CONE/COLD KNIFE CERVIX;  Surgeon: Fernanda Orozco MD;  Location: [facility];  Service: Gynecology    Tricia Bryan W/ADJ VAG,W/LOOP BX N/A 06/22/2017    Procedure: BIOPSY LEEP CERVIX;  Surgeon: Kate Barton MD;  Location: [facility];  Service: Gynecology    FL HYSTEROSCOPY,W/ENDO BX N/A 8/12/2022    Procedure: DILATATION AND CURETTAGE (D&C) WITH HYSTEROSCOPY;  Surgeon: Fernanda Orozco MD;  Location: [facility];  Service: Gynecology    FL LAP,RMV  ADNEXAL STRUCTURE Bilateral 06/22/2017    Procedure: LAPAROSCOPIC SALPINGECTOMY;  Surgeon: Kate Barton MD;  Location: [facility];  Service: Gynecology    TUBAL LIGATION Objective:    Vitals: Blood pressure 108/77, pulse 86, resp  rate 18, height 5' 2" (1 575 m), weight 68 9 kg (152 lb), not currently breastfeeding  Body mass index is 27 8 kg/m²  Assessment/Plan:  We discussed different ways to do a hysterectomy, including TVH, LAVH, TLH, LSH, and ROSANNE  I was hoping she would be a good candidate for a TVH given she has had a prior salpingectomy as well as 6 vaginal deliveries, however her recent op note shows that she did not have good descensus (less than grade 1 per Dr Melisa Aaron attestation) and therefore was consented for a TLH  The patient elects to undergo a TLH, cystoscopy, all other indicated procedures for a diagnosis of abnormal uterine bleeding and persistent cervical dysplasia  The patient has previously attempted medical managment  She understands that she will no longer be able to bear children following the completion of this procedure  Risks, benefits, and alternatives have been reviewed with the patient, including but not limited to infection, bleeding, injury to the uterus and surrounding organs, such as bowel, bladder, blood vessels, nerves and ureters, risks of anesthesia, blood clots and death  We discussed alternatives to hysterectomy such as medical management with nonhormonal and hormonal pills, hormonal IUD, or endometrial ablation  In general these conservative measures carry lower risk than invasive surgery  Possible Complications of Hysterectomy (reviewed with patient)  - Conversion to laparotomy (3 9%)  - UTI (1 2-2 6%)  - Vaginal cuff dehiscence: (0 64-1 1%)  - Bowel injury (0 34-0 45%)  - Urinary tract injury (0 7-1 1% or 2 9-4 7% if prior )  - Wound infection (1 4%)    She has been counseled regarding the possible need for a blood transfusion and she accepts blood products  The patient is aware that if the procedure cannot be safely completed laparoscopically, I will proceed with an open procedure    The patients fallopian tubes were already removed  The patients ovaries will be mainted at this time  The patients medical insurance is federally funded/medical assistance, therefore, the MA-30 form has been signed and on the chart        Problem List Items Addressed This Visit        Genitourinary    Abnormal uterine bleeding (AUB)       Other    Post-operative state - Primary    Relevant Orders    CBC and Platelet    HEMOGLOBIN A1C W/ EAG ESTIMATION    Comprehensive metabolic panel          D/w Dr Nick Stanford, DO  9/8/2022  3:14 PM

## 2022-09-08 ENCOUNTER — OFFICE VISIT (OUTPATIENT)
Dept: OBGYN CLINIC | Facility: CLINIC | Age: 37
End: 2022-09-08

## 2022-09-08 VITALS
RESPIRATION RATE: 18 BRPM | BODY MASS INDEX: 27.97 KG/M2 | SYSTOLIC BLOOD PRESSURE: 108 MMHG | HEIGHT: 62 IN | DIASTOLIC BLOOD PRESSURE: 77 MMHG | HEART RATE: 86 BPM | WEIGHT: 152 LBS

## 2022-09-08 DIAGNOSIS — N93.9 ABNORMAL UTERINE BLEEDING (AUB): ICD-10-CM

## 2022-09-08 DIAGNOSIS — Z98.890 POST-OPERATIVE STATE: Primary | ICD-10-CM

## 2022-09-08 PROCEDURE — 99024 POSTOP FOLLOW-UP VISIT: CPT | Performed by: OBSTETRICS & GYNECOLOGY

## 2022-09-16 ENCOUNTER — APPOINTMENT (OUTPATIENT)
Dept: LAB | Facility: CLINIC | Age: 37
End: 2022-09-16
Payer: COMMERCIAL

## 2022-09-16 DIAGNOSIS — Z98.890 POST-OPERATIVE STATE: ICD-10-CM

## 2022-09-16 LAB
ALBUMIN SERPL BCP-MCNC: 3.9 G/DL (ref 3.5–5)
ALP SERPL-CCNC: 67 U/L (ref 46–116)
ALT SERPL W P-5'-P-CCNC: 16 U/L (ref 12–78)
ANION GAP SERPL CALCULATED.3IONS-SCNC: 5 MMOL/L (ref 4–13)
AST SERPL W P-5'-P-CCNC: 10 U/L (ref 5–45)
BILIRUB SERPL-MCNC: 1.15 MG/DL (ref 0.2–1)
BUN SERPL-MCNC: 11 MG/DL (ref 5–25)
CALCIUM SERPL-MCNC: 8.9 MG/DL (ref 8.3–10.1)
CHLORIDE SERPL-SCNC: 110 MMOL/L (ref 96–108)
CO2 SERPL-SCNC: 24 MMOL/L (ref 21–32)
CREAT SERPL-MCNC: 0.62 MG/DL (ref 0.6–1.3)
ERYTHROCYTE [DISTWIDTH] IN BLOOD BY AUTOMATED COUNT: 15.4 % (ref 11.6–15.1)
EST. AVERAGE GLUCOSE BLD GHB EST-MCNC: 108 MG/DL
GFR SERPL CREATININE-BSD FRML MDRD: 113 ML/MIN/1.73SQ M
GLUCOSE P FAST SERPL-MCNC: 87 MG/DL (ref 65–99)
HBA1C MFR BLD: 5.4 %
HCT VFR BLD AUTO: 34.7 % (ref 34.8–46.1)
HGB BLD-MCNC: 10.9 G/DL (ref 11.5–15.4)
MCH RBC QN AUTO: 26.1 PG (ref 26.8–34.3)
MCHC RBC AUTO-ENTMCNC: 31.4 G/DL (ref 31.4–37.4)
MCV RBC AUTO: 83 FL (ref 82–98)
PLATELET # BLD AUTO: 371 THOUSANDS/UL (ref 149–390)
PMV BLD AUTO: 10.8 FL (ref 8.9–12.7)
POTASSIUM SERPL-SCNC: 3.5 MMOL/L (ref 3.5–5.3)
PROT SERPL-MCNC: 7.5 G/DL (ref 6.4–8.4)
RBC # BLD AUTO: 4.18 MILLION/UL (ref 3.81–5.12)
SODIUM SERPL-SCNC: 139 MMOL/L (ref 135–147)
WBC # BLD AUTO: 5.36 THOUSAND/UL (ref 4.31–10.16)

## 2022-09-16 PROCEDURE — 36415 COLL VENOUS BLD VENIPUNCTURE: CPT

## 2022-09-16 PROCEDURE — 80053 COMPREHEN METABOLIC PANEL: CPT

## 2022-09-16 PROCEDURE — 83036 HEMOGLOBIN GLYCOSYLATED A1C: CPT

## 2022-09-16 PROCEDURE — 85027 COMPLETE CBC AUTOMATED: CPT

## 2022-09-19 ENCOUNTER — TELEPHONE (OUTPATIENT)
Dept: LABOR AND DELIVERY | Facility: HOSPITAL | Age: 37
End: 2022-09-19

## 2022-09-26 NOTE — TELEPHONE ENCOUNTER
[facility] Surgical Case Review  Date Reviewed: 9/19/22  Reviewed by: Dr Dietrich  Case request: LAVH vs TLH, cystoscopy, all other indicated procedures   Indication: AUB, persistent ALVERTO 2-3  Surgical Consent: Signed 9/8/22  MA30/31: 9/8/22    Case request approved: Yes    Comments:  [ ] Needs H&P with documented pelvic exam  [ ] Needs to consent to say ELODIA vs Obey Sutherland MD  PGY-4, OBGYN

## 2022-09-28 ENCOUNTER — PREP FOR PROCEDURE (OUTPATIENT)
Dept: OBGYN CLINIC | Facility: CLINIC | Age: 37
End: 2022-09-28

## 2022-09-28 DIAGNOSIS — N93.9 ABNORMAL UTERINE BLEEDING (AUB): Primary | ICD-10-CM

## 2022-09-28 NOTE — TELEPHONE ENCOUNTER
Error, incorrect CPT code provided  CPT code 72448 for hysterectomy does require prior auth  Clinical information faxed to insurance for review

## 2022-09-30 ENCOUNTER — CLINICAL SUPPORT (OUTPATIENT)
Dept: OBGYN CLINIC | Facility: CLINIC | Age: 37
End: 2022-09-30

## 2022-09-30 DIAGNOSIS — Z23 NEED FOR HPV VACCINE: Primary | ICD-10-CM

## 2022-09-30 PROCEDURE — 90471 IMMUNIZATION ADMIN: CPT

## 2022-09-30 PROCEDURE — 90651 9VHPV VACCINE 2/3 DOSE IM: CPT

## 2022-09-30 PROCEDURE — 96372 THER/PROPH/DIAG INJ SC/IM: CPT

## 2022-11-21 ENCOUNTER — APPOINTMENT (EMERGENCY)
Dept: RADIOLOGY | Facility: HOSPITAL | Age: 37
End: 2022-11-21

## 2022-11-21 ENCOUNTER — HOSPITAL ENCOUNTER (EMERGENCY)
Facility: HOSPITAL | Age: 37
Discharge: HOME | End: 2022-11-21
Attending: EMERGENCY MEDICINE

## 2022-11-21 VITALS
HEART RATE: 71 BPM | SYSTOLIC BLOOD PRESSURE: 115 MMHG | TEMPERATURE: 98.3 F | HEIGHT: 62 IN | WEIGHT: 152 LBS | DIASTOLIC BLOOD PRESSURE: 74 MMHG | OXYGEN SATURATION: 100 % | BODY MASS INDEX: 27.97 KG/M2 | RESPIRATION RATE: 16 BRPM

## 2022-11-21 DIAGNOSIS — S93.401A RIGHT ANKLE SPRAIN: Primary | ICD-10-CM

## 2022-11-21 RX ORDER — KETOROLAC TROMETHAMINE 30 MG/ML
15 INJECTION, SOLUTION INTRAMUSCULAR; INTRAVENOUS ONCE
Status: COMPLETED | OUTPATIENT
Start: 2022-11-21 | End: 2022-11-21

## 2022-11-21 RX ORDER — ACETAMINOPHEN 325 MG/1
650 TABLET ORAL EVERY 6 HOURS PRN
Qty: 30 TABLET | Refills: 0 | Status: SHIPPED | OUTPATIENT
Start: 2022-11-21

## 2022-11-21 RX ORDER — ACETAMINOPHEN 325 MG/1
975 TABLET ORAL ONCE
Status: COMPLETED | OUTPATIENT
Start: 2022-11-21 | End: 2022-11-21

## 2022-11-21 RX ADMIN — ACETAMINOPHEN 975 MG: 325 TABLET ORAL at 07:52

## 2022-11-21 RX ADMIN — KETOROLAC TROMETHAMINE 15 MG: 30 INJECTION, SOLUTION INTRAMUSCULAR at 07:53

## 2022-11-21 NOTE — ED PROVIDER NOTES
History  Chief Complaint   Patient presents with   • Ankle Pain     Pt presents to the ED with c/o R ankle pain  Pt states that she tripped and fell at work  Patient is a 78-year-old female with no significant past medical history who presents with right ankle pain following a fall  Patient says she works for [employer] and she tripped over a box this morning falling onto her right lateral thigh  She denies any head strike, no loss of consciousness, denies being on blood thinners  She says immediately following the incident she felt like her vision went dark and she has poor memory but she was able to walk around and surgery help, she tripped uses episode to pain  She says currently she has pain over the proximal dorsum of her right foot and plantar aspect of her foot  She also endorses some numbness over the right foot  She also has some mild pain over the proximal aspect of her right lateral thigh where she made impact with the ground  She was able to walk immediately after the incident and was able to walk into the department  She denies any previous surgeries on this leg  She denies any other complaints at this time, no chest pain, shortness a breath, palpitations            None       Past Medical History:   Diagnosis Date   • Abnormal cells of cervix    • Abnormal cytological finding in specimen from cervix 4/5/2022   • ALVERTO III (cervical intraepithelial neoplasia III)    • Headache    • Varicella        Past Surgical History:   Procedure Laterality Date   • CERVICAL BIOPSY Bilateral 8/12/2022    Procedure: BIOPSY CONE/COLD KNIFE CERVIX;  Surgeon: Thony Castro MD;  [facility];  Service: Gynecology   • Jose Alvarenga W/ADJ VAG,W/LOOP BX N/A 06/22/2017    Procedure: BIOPSY LEEP CERVIX;  Surgeon: Sandra Patino MD;  Location: [facility];  Service: Gynecology   • HI HYSTEROSCOPY,W/ENDO BX N/A 8/12/2022    Procedure: DILATATION AND CURETTAGE (D&C) WITH HYSTEROSCOPY;  Surgeon: Denis Hess Bayron Guadarrama MD;  Location:  MAIN OR;  Service: Gynecology   • MT LAP,RMV  ADNEXAL STRUCTURE Bilateral 2017    Procedure: LAPAROSCOPIC SALPINGECTOMY;  Surgeon: Jaspal Gomez MD;  Location:  MAIN OR;  Service: Gynecology   • TUBAL LIGATION         Family History   Problem Relation Age of Onset   • No Known Problems Mother    • No Known Problems Father    • No Known Problems Sister    • No Known Problems Brother    • No Known Problems Daughter    • No Known Problems Son    • No Known Problems Maternal Aunt    • No Known Problems Maternal Uncle    • No Known Problems Paternal Aunt    • No Known Problems Paternal Uncle    • No Known Problems Maternal Grandmother    • No Known Problems Maternal Grandfather    • No Known Problems Paternal Grandmother    • No Known Problems Paternal Grandfather      I have reviewed and agree with the history as documented  E-Cigarette/Vaping   • E-Cigarette Use Never User      E-Cigarette/Vaping Substances   • Nicotine No    • THC No    • CBD No    • Flavoring No    • Other No    • Unknown No      Social History     Tobacco Use   • Smoking status: Former     Packs/day: 0 25     Types: Cigarettes     Quit date: 2022     Years since quittin 8   • Smokeless tobacco: Never   Vaping Use   • Vaping Use: Never used   Substance Use Topics   • Alcohol use: Never   • Drug use: Never     Comment: none        Review of Systems   Constitutional: Negative for chills and fever  HENT: Negative for congestion and rhinorrhea  Eyes: Negative for pain and redness  Respiratory: Negative for shortness of breath and wheezing  Cardiovascular: Negative for chest pain and leg swelling  Gastrointestinal: Negative for abdominal pain, diarrhea and nausea  Genitourinary: Negative for dysuria and hematuria  Musculoskeletal: Negative for back pain and neck pain  Skin: Negative for rash and wound  Neurological: Positive for numbness   Negative for dizziness, weakness and light-headedness  Psychiatric/Behavioral: Negative for confusion and decreased concentration  Physical Exam  ED Triage Vitals [11/21/22 0649]   Temperature Pulse Respirations Blood Pressure SpO2   98 3 °F (36 8 °C) 71 16 115/74 100 %      Temp Source Heart Rate Source Patient Position - Orthostatic VS BP Location FiO2 (%)   Oral Monitor Standing Left arm --      Pain Score       8             Orthostatic Vital Signs  Vitals:    11/21/22 0649   BP: 115/74   Pulse: 71   Patient Position - Orthostatic VS: Standing       Physical Exam  Vitals and nursing note reviewed  Constitutional:       General: She is not in acute distress  Appearance: Normal appearance  She is normal weight  She is not ill-appearing, toxic-appearing or diaphoretic  HENT:      Head: Normocephalic and atraumatic  Right Ear: External ear normal       Left Ear: External ear normal       Nose: Nose normal  No congestion or rhinorrhea  Mouth/Throat:      Mouth: Mucous membranes are moist       Pharynx: Oropharynx is clear  No oropharyngeal exudate or posterior oropharyngeal erythema  Eyes:      General: No scleral icterus  Right eye: No discharge  Left eye: No discharge  Cardiovascular:      Rate and Rhythm: Normal rate and regular rhythm  Pulses: Normal pulses  Heart sounds: Normal heart sounds  No murmur heard  No friction rub  No gallop  Pulmonary:      Effort: Pulmonary effort is normal  No respiratory distress  Breath sounds: Normal breath sounds  No wheezing or rales  Abdominal:      General: Abdomen is flat  Bowel sounds are normal  There is no distension  Palpations: Abdomen is soft  Tenderness: There is no abdominal tenderness  There is no guarding  Musculoskeletal:         General: Tenderness present  No swelling or deformity  Cervical back: Normal range of motion and neck supple  Right lower leg: No edema  Left lower leg: No edema        Comments: There is tenderness over the posterior medial malleolus of the right foot  There is tenderness over the proximal dorsum of the right foot  There is tenderness over the plantar surface of the foot just distal to the calcaneus  There is full passive range of motion of bilateral ankles, knees, and hips  There is limited active range of motion of the right ankle which patient attributes to pain  There is pain to palpation over the right thigh a few cm distal to the inguinal crease  Skin:     General: Skin is warm and dry  Capillary Refill: Capillary refill takes less than 2 seconds  Findings: No bruising or erythema  Neurological:      Mental Status: She is alert and oriented to person, place, and time  Gait: Gait normal    Psychiatric:         Mood and Affect: Mood normal          Behavior: Behavior normal          Thought Content: Thought content normal          Judgment: Judgment normal          ED Medications  Medications   acetaminophen (TYLENOL) tablet 975 mg (has no administration in time range)   ketorolac (TORADOL) injection 15 mg (has no administration in time range)       Diagnostic Studies  Results Reviewed     None                 XR ankle 3+ views RIGHT    (Results Pending)   XR foot 3+ views RIGHT    (Results Pending)         Procedures  Procedures      ED Course                                       MDM  Number of Diagnoses or Management Options  Right ankle sprain: minor  Diagnosis management comments: 58-year-old female who presents following tripping and falling with right ankle pain  Ankle sprain versus fracture  Patient has tenderness at the posterior medial malleolus, he has criteria for ankle and foot x-ray at this time  No tenderness of the fibula or knee  Pain control  Imaging shows no acute osseous abnormalities    Will wrap patient's ankle in an ACE bandage, instructions for pain control, ice, elevation, and rest   Patient is directed to contact her companies occupational health in case she feels she has more time off  Amount and/or Complexity of Data Reviewed  Tests in the radiology section of CPT®: ordered and reviewed  Review and summarize past medical records: yes  Discuss the patient with other providers: yes  Independent visualization of images, tracings, or specimens: yes    Patient Progress  Patient progress: improved      Disposition  Final diagnoses:   None     ED Disposition     None      Follow-up Information    None         Patient's Medications    No medications on file     No discharge procedures on file  PDMP Review     None           ED Provider  Attending physically available and evaluated Caitlin Sorenson  I managed the patient along with the ED Attending      Electronically Signed by         Sapna Barbour MD  11/21/22 2680

## 2022-11-21 NOTE — ED ATTENDING ATTESTATION
Final Diagnosis:  1  Right ankle sprain           I, Dulce Arteaga MD, saw and evaluated the patient  All available labs and X-rays were ordered by me or the resident and have been reviewed by myself  I discussed the patient with the resident / non-physician and agree with the resident's / non-physician practitioner's findings and plan as documented in the resident's / non-physician practicitioner's note, except where noted  At this point, I agree with the current assessment done in the ED  I was present during key portions of all procedures performed unless otherwise stated  Chief Complaint   Patient presents with   • Ankle Pain     Pt presents to the ED with c/o R ankle pain  Pt states that she tripped and fell at work  This is a 42 y o  female presenting for evaluation of ankle pain  She fell at work (works at [employer]), tripped over box  Hit head while falling to the RIGHT side  Felt some blurry vision due to pain  Can walk on that leg  Walked after the injury  Anterior proximal prosum of foot near the ankle is where it hurts the most   No AC/AP  No headache  Here for ankle pain  PMH:   has a past medical history of Abnormal cells of cervix, Abnormal cytological finding in specimen from cervix (2022), ALVERTO III (cervical intraepithelial neoplasia III), Headache, and Varicella  PSH:   has a past surgical history that includes pr lap,rmv  adnexal structure (Bilateral, 2017); pr colposcopy,cervix w/adj vag,w/loop bx (N/A, 2017); Tubal ligation; pr hysteroscopy,w/endo bx (N/A, 2022); and Cervical biopsy (Bilateral, 2022)      Social:  Social History     Substance and Sexual Activity   Alcohol Use Never     Social History     Tobacco Use   Smoking Status Former   • Packs/day: 0 25   • Types: Cigarettes   • Quit date: 2022   • Years since quittin 8   Smokeless Tobacco Never     Social History     Substance and Sexual Activity   Drug Use Never    Comment: none     PE:  Vitals:    22 0649   BP: 115/74   BP Location: Left arm   Pulse: 71   Resp: 16   Temp: 98 3 °F (36 8 °C)   TempSrc: Oral   SpO2: 100%   Weight: 68 9 kg (152 lb)   Height: 5' 2" (1 575 m)   General: VSS, NAD, awake, alert  Well-nourished, well-developed  Appears stated age  Head: Normocephalic, atraumatic, nontender  Eyes: PERRL, EOM-I  No diplopia  No hyphema  No subconjunctival hemorrhages  Symmetrical lids  ENTAtraumatic external nose and ears  MMM  No stridor  Normal phonation  No drooling  Base of mouth is soft  No mastoid tenderness  Neck: Symmetric, trachea midline  No JVD  No midline c-spine tenderness  CV: Peripheral pulses +2 throughout  No chest wall tenderness  Lungs:   Unlabored   No retractions  No crepitus  No tachypnea  No paradoxical motion  Abd: +BS, soft, NT/ND    MSK:   FROM   No lower extremity edema  Able to bear weight  RIGHT proximal ankle tenderness near medial and lateral malleolus  No ecchymosis  EHL/FHL/PF/DF/KF/KE/HF/HE 5/5  No tenderness of the 5th metatarsal, no tenderness of fibular head, no cog-sensation with rotation along joint of LisFranc  Skin: Dry, intact  Neuro: AAOx3, GCS 15, CN II-XII grossly intact  Motor grossly intact  Psychiatric/Behavioral: Appropriate mood and affect   Exam: deferred  A:  - ankle pain  P:  - Tylenol/NSAIDs  - XRs  - The patient has none of the followin  Focal neurologic deficit  2  Midline spinal tenderness  3  AMS  4  Intoxication  5  Distracting injury  The C-Spine can be cleared clinically by these criteria; imaging is not required  The patient is low risk of SAH  Doing a CT is adequate as the time of onset of headache to time of CT is within 6 hours and doing an LP is unlikely to add any more information (BMJ [id number];047:)     - Patient has none of the following: HA, Vomiting, Age>60, Alcohol/Drug intoxication, persistent anterograde amnesia, visible trauma above clavicles, seizures  The [place] Head CT Rule suggests a head CT is not necessary for this patient to rule out an intracranial traumatic finding (sensitivity %)  - 13 point ROS was performed and all are normal unless stated in the history above  - Nursing note reviewed  Vitals reviewed  - Orders placed by myself and/or advanced practitioner / resident     - Previous chart was reviewed  - No language barrier    - History obtained from patient  - There are no limitations to the history obtained  - Critical care time: Not applicable for this patient  Code Status: No Order  Advance Directive and Living Will:      Power of :    POLST:      Medications   acetaminophen (TYLENOL) tablet 975 mg (975 mg Oral Given 11/21/22 0752)   ketorolac (TORADOL) injection 15 mg (15 mg Intramuscular Given 11/21/22 0753)     XR ankle 3+ views RIGHT   Final Result      No acute osseous abnormality  Workstation performed: [id number]         XR foot 3+ views RIGHT   Final Result      No acute osseous abnormality  Workstation performed: [id number]           Orders Placed This Encounter   Procedures   • XR ankle 3+ views RIGHT   • XR foot 3+ views RIGHT     Labs Reviewed - No data to display  Time reflects when diagnosis was documented in both MDM as applicable and the Disposition within this note     Time User Action Codes Description Comment    11/21/2022  8:10 AM [place] Lamp Add [F22 130M] Right ankle sprain       ED Disposition     ED Disposition   Discharge    Condition   Fair    Date/Time   Mon Nov 21, 2022  8:10 AM    Comment   [address] discharge to home/self care                 Follow-up Information     Follow up With Specialties Details Why Contact Info Additional [address] Emergency Department Emergency Medicine Go to  If symptoms worsen or if you have any other specific concerns [address] [phone]  [phone] [facility] Emergency Department, [address] [place], [place], [facility] Call today To make appointment for reevaluation if you do not have a PCP [address] [facility] [phone]  [address], [address] [place], [place], [place]        Discharge Medication List as of 11/21/2022  8:21 AM      START taking these medications    Details   acetaminophen (TYLENOL) 325 mg tablet Take 2 tablets (650 mg total) by mouth every 6 (six) hours as needed for mild pain, Starting Mon 11/21/2022, Normal           No discharge procedures on file  None       Portions of the record may have been created with voice recognition software  Occasional wrong word or "sound a like" substitutions may have occurred due to the inherent limitations of voice recognition software  Read the chart carefully and recognize, using context, where substitutions have occurred      Electronically signed by:  Dulce Arteaga

## 2022-11-21 NOTE — Clinical Note
Heena Amrik was seen and treated in our emergency department on 11/21/2022  Diagnosis:     Sachi Hodges  may return to work on return date  She may return on this date: 11/23/2022         If you have any questions or concerns, please don't hesitate to call        Demetris Rhodes MD    ______________________________           _______________          _______________  Hospital Representative                              Date                                Time

## 2022-11-21 NOTE — DISCHARGE INSTRUCTIONS
Please use Tylenol Motrin for your pain  Elevate and ice your ankle  Keep your ankle wrapped to help support your foot  You may use crutches if he feel like you need to  Please follow-up with occupational medicine if your pain persists past 2 days any feel like he will need more time off of work  Please return to the emergency department if you develop any new or concerning symptoms such as inability to walk, fevers, or shortness of breath

## 2022-12-01 ENCOUNTER — OFFICE VISIT (OUTPATIENT)
Dept: OBGYN CLINIC | Facility: CLINIC | Age: 37
End: 2022-12-01

## 2022-12-01 VITALS
HEIGHT: 62 IN | HEART RATE: 78 BPM | WEIGHT: 152.6 LBS | BODY MASS INDEX: 28.08 KG/M2 | DIASTOLIC BLOOD PRESSURE: 74 MMHG | SYSTOLIC BLOOD PRESSURE: 111 MMHG

## 2022-12-01 DIAGNOSIS — N93.9 ABNORMAL UTERINE BLEEDING (AUB): ICD-10-CM

## 2022-12-01 DIAGNOSIS — Z01.818 PREOPERATIVE EXAM FOR GYNECOLOGIC SURGERY: Primary | ICD-10-CM

## 2022-12-01 DIAGNOSIS — R87.619: ICD-10-CM

## 2022-12-01 DIAGNOSIS — B96.89 BACTERIAL VAGINOSIS: ICD-10-CM

## 2022-12-01 DIAGNOSIS — N76.0 BACTERIAL VAGINOSIS: ICD-10-CM

## 2022-12-01 RX ORDER — METRONIDAZOLE 500 MG/1
500 TABLET ORAL EVERY 12 HOURS SCHEDULED
Qty: 14 TABLET | Refills: 0 | Status: SHIPPED | OUTPATIENT
Start: 2022-12-01 | End: 2022-12-08

## 2022-12-01 NOTE — ASSESSMENT & PLAN NOTE
-Surgical consent signed for total laparoscopic hysterectomy vs laparoscopic assisted vaginal hysterectomy with bilateral salpingectomy, possible exploratory laparotomy and all other indicated procedures  -Clear liquid until 2hr pre-procedure per ERAS protocol  -Chlorhexidine wash given to patient  -Ancef 2g IV for surgical prophylaxis  -Return 2 weeks post-procedure for postoperative check

## 2022-12-01 NOTE — PROGRESS NOTES
[facility] H&P Exam  De Wyman 42 y o  female MRN: [id number]    ASSESSMENT/PLAN:  42yo  female who is scheduled for total hysterectomy with bilateral salpinegctomy on 2022  Problem List Items Addressed This Visit        Genitourinary    Abnormal uterine bleeding (AUB)     -Surgical consent signed for total laparoscopic hysterectomy vs laparoscopic assisted vaginal hysterectomy with bilateral salpingectomy, possible exploratory laparotomy and all other indicated procedures  -Clear liquid until 2hr pre-procedure per ERAS protocol  -Chlorhexidine wash given to patient  -Ancef 2g IV for surgical prophylaxis  -Return 2 weeks post-procedure for postoperative check           Bacterial vaginosis     Metronidazole 500mg BID x 7 days prescibed         Relevant Medications    metroNIDAZOLE (FLAGYL) 500 mg tablet       Other    Abnormal cytological finding in specimen from cervix     Understands she will still need pap smears after total hysterectomy        Other Visit Diagnoses     Preoperative exam for gynecologic surgery    -  Primary          Discussed with Dr Jennifer Godoy  HPI:  De Wyman is a 42 y o  [id number] female who will be undergoing total hysterectomy on 2022 due to abnormal uterine bleeding and persistent cervical dyplasia  Last cervical cancer screening: HSIL pap, other HR HPV positive 2022  Completed LEEP 2022 and specimen showed CIN3 with negative margins  EMB and ECC at that time were negative for dysplasia or malignancy  She consents to CPR and blood products if needed during admission  LMP was 2022  Contraception: surgical sterilization (bilateral tubal ligation)      Review of Systems   Constitutional: Negative for appetite change, chills, fatigue and fever  Respiratory: Negative for chest tightness, shortness of breath and wheezing  Cardiovascular: Negative for chest pain     Gastrointestinal: Negative for abdominal pain, diarrhea, nausea and vomiting  Endocrine: Negative  Genitourinary: Positive for menstrual problem and vaginal discharge  Musculoskeletal: Negative for joint swelling  Skin: Negative for rash and wound  Neurological: Negative  Hematological: Negative  Psychiatric/Behavioral: Negative          OB History    Para Term  AB Living   6 6 6 0 0 6   SAB IAB Ectopic Multiple Live Births   0 0 0 0 6      # Outcome Date GA Lbr Kwadwo/2nd Weight Sex Delivery Anes PTL Lv   6 Term 16 37w0d  2705 g (5 lb 15 4 oz) F Vag-Spont EPI  EDUARDO   5 Term 05 40w0d  3062 g (6 lb 12 oz) M Vag-Spont EPI  EDUARDO   4 Term 03 40w0d  3742 g (8 lb 4 oz) M Vag-Spont None  EDUARDO   3 Term 02 40w0d  3147 g (6 lb 15 oz) M Vag-Spont None  EDUARDO   2 Term 10/28/98 40w0d  3062 g (6 lb 12 oz) F Vag-Spont EPI  EDUARDO   1 Term 97 40w0d  2948 g (6 lb 8 oz) F Vag-Spont EPI  EDUARDO     Historical Information   Past Medical History:   Diagnosis Date   • Abnormal cells of cervix    • Abnormal cytological finding in specimen from cervix 2022   • ALVERTO III (cervical intraepithelial neoplasia III)    • Headache    • Varicella      Past Surgical History:   Procedure Laterality Date   • CERVICAL BIOPSY Bilateral 2022    Procedure: BIOPSY CONE/COLD KNIFE CERVIX;  Surgeon: Broderick Combs MD;  [facility];  Service: Gynecology   • Harjeet Mcgrath W/ADJ VAG,W/LOOP BX N/A 2017    Procedure: BIOPSY LEEP CERVIX;  Surgeon: Francoise Spatz, MD;  [facility];  Service: Gynecology   • [place] HYSTEROSCOPY,W/ENDO BX N/A 2022    Procedure: DILATATION AND CURETTAGE (D&C) WITH HYSTEROSCOPY;  Surgeon: Broderick Combs MD;  [facility];  Service: Gynecology   • MN LAP,RMV  ADNEXAL STRUCTURE Bilateral 2017    Procedure: LAPAROSCOPIC SALPINGECTOMY;  Surgeon: Francoise Spatz, MD;  [facility];  Service: Gynecology   • TUBAL LIGATION       Family History   Problem Relation Age of Onset   • No Known Problems Mother • No Known Problems Father    • No Known Problems Sister    • No Known Problems Brother    • No Known Problems Daughter    • No Known Problems Son    • No Known Problems Maternal Aunt    • No Known Problems Maternal Uncle    • No Known Problems Paternal Aunt    • No Known Problems Paternal Uncle    • No Known Problems Maternal Grandmother    • No Known Problems Maternal Grandfather    • No Known Problems Paternal Grandmother    • No Known Problems Paternal Grandfather        Social History   Social History     Substance and Sexual Activity   Alcohol Use Never     Social History     Substance and Sexual Activity   Drug Use Never    Comment: none     Social History     Tobacco Use   Smoking Status Former   • Packs/day: 0 25   • Types: Cigarettes   • Quit date: 2022   • Years since quittin 8   Smokeless Tobacco Never         Meds/Allergies    (Not in a hospital admission)     No Known Allergies    OBJECTIVE:  Vitals: Blood pressure 111/74, pulse 78, height 5' 2" (1 575 m), weight 69 2 kg (152 lb 9 6 oz), last menstrual period 2022, not currently breastfeeding  Body mass index is 27 91 kg/m²  Physical Exam  Vitals reviewed  Constitutional:       General: She is not in acute distress  Appearance: Normal appearance  She is not ill-appearing or toxic-appearing  HENT:      Head: Normocephalic and atraumatic  Cardiovascular:      Rate and Rhythm: Normal rate and regular rhythm  Heart sounds: Normal heart sounds  No murmur heard  Pulmonary:      Effort: Pulmonary effort is normal  No respiratory distress  Breath sounds: No wheezing, rhonchi or rales  Abdominal:      General: There is no distension  Palpations: Abdomen is soft  Tenderness: There is no abdominal tenderness  There is no guarding or rebound  Genitourinary:     Comments: Normal external female genitalia  Normal vaginal mucosa  Thin gray white discharge present with odor   Reduced cervical stroma from prior cervical excisional procedures  No cervical lesions noted  10w sized anteverted, mobile uterus  No adnexal masses palapable  Musculoskeletal:         General: No swelling or tenderness  Skin:     General: Skin is warm and dry  Neurological:      Mental Status: She is alert and oriented to person, place, and time     Psychiatric:         Mood and Affect: Mood normal          Behavior: Behavior normal            Sonia Cueva MD  12/1/2022  10:34 AM

## 2022-12-01 NOTE — H&P (VIEW-ONLY)
[facility] H&P Exam  Tyree Holloway 42 y o  female MRN: [id number]    ASSESSMENT/PLAN:  40yo  female who is scheduled for total hysterectomy with bilateral salpinegctomy on 2022  Problem List Items Addressed This Visit        Genitourinary    Abnormal uterine bleeding (AUB)     -Surgical consent signed for total laparoscopic hysterectomy vs laparoscopic assisted vaginal hysterectomy with bilateral salpingectomy, possible exploratory laparotomy and all other indicated procedures  -Clear liquid until 2hr pre-procedure per ERAS protocol  -Chlorhexidine wash given to patient  -Ancef 2g IV for surgical prophylaxis  -Return 2 weeks post-procedure for postoperative check           Bacterial vaginosis     Metronidazole 500mg BID x 7 days prescibed         Relevant Medications    metroNIDAZOLE (FLAGYL) 500 mg tablet       Other    Abnormal cytological finding in specimen from cervix     Understands she will still need pap smears after total hysterectomy        Other Visit Diagnoses     Preoperative exam for gynecologic surgery    -  Primary          Discussed with Dr Carissa Carrero  HPI:  Tyree Holloway is a 42 y o  [id number] female who will be undergoing total hysterectomy on 2022 due to abnormal uterine bleeding and persistent cervical dyplasia  Last cervical cancer screening: HSIL pap, other HR HPV positive 2022  Completed LEEP 2022 and specimen showed CIN3 with negative margins  EMB and ECC at that time were negative for dysplasia or malignancy  She consents to CPR and blood products if needed during admission  LMP was 2022  Contraception: surgical sterilization (bilateral tubal ligation)      Review of Systems   Constitutional: Negative for appetite change, chills, fatigue and fever  Respiratory: Negative for chest tightness, shortness of breath and wheezing  Cardiovascular: Negative for chest pain     Gastrointestinal: Negative for abdominal pain, diarrhea, nausea and vomiting  Endocrine: Negative  Genitourinary: Positive for menstrual problem and vaginal discharge  Musculoskeletal: Negative for joint swelling  Skin: Negative for rash and wound  Neurological: Negative  Hematological: Negative  Psychiatric/Behavioral: Negative          OB History    Para Term  AB Living   6 6 6 0 0 6   SAB IAB Ectopic Multiple Live Births   0 0 0 0 6      # Outcome Date GA Lbr Kwadwo/2nd Weight Sex Delivery Anes PTL Lv   6 Term 16 37w0d  2705 g (5 lb 15 4 oz) F Vag-Spont EPI  EDUARDO   5 Term 05 40w0d  3062 g (6 lb 12 oz) M Vag-Spont EPI  EDUARDO   4 Term 03 40w0d  3742 g (8 lb 4 oz) M Vag-Spont None  EDUARDO   3 Term 02 40w0d  3147 g (6 lb 15 oz) M Vag-Spont None  EDUARDO   2 Term 10/28/98 40w0d  3062 g (6 lb 12 oz) F Vag-Spont EPI  EDUARDO   1 Term 97 40w0d  2948 g (6 lb 8 oz) F Vag-Spont EPI  EDUARDO     Historical Information   Past Medical History:   Diagnosis Date   • Abnormal cells of cervix    • Abnormal cytological finding in specimen from cervix 2022   • ALVERTO III (cervical intraepithelial neoplasia III)    • Headache    • Varicella      Past Surgical History:   Procedure Laterality Date   • CERVICAL BIOPSY Bilateral 2022    Procedure: BIOPSY CONE/COLD KNIFE CERVIX;  Surgeon: Vicky Sommer MD;  [facility];  Service: Gynecology   • Celestine Crews W/ADJ VAG,W/LOOP BX N/A 2017    Procedure: BIOPSY LEEP CERVIX;  Surgeon: Gonzalez Reynolds MD;  [facility];  Service: Gynecology   • [place] HYSTEROSCOPY,W/ENDO BX N/A 2022    Procedure: DILATATION AND CURETTAGE (D&C) WITH HYSTEROSCOPY;  Surgeon: Vicky Sommer MD;  [facility];  Service: Gynecology   • [place] LAP,RMV  ADNEXAL STRUCTURE Bilateral 2017    Procedure: LAPAROSCOPIC SALPINGECTOMY;  Surgeon: Gonzalez Reynolds MD;  [facility];  Service: Gynecology   • TUBAL LIGATION       Family History   Problem Relation Age of Onset   • No Known Problems Mother • No Known Problems Father    • No Known Problems Sister    • No Known Problems Brother    • No Known Problems Daughter    • No Known Problems Son    • No Known Problems Maternal Aunt    • No Known Problems Maternal Uncle    • No Known Problems Paternal Aunt    • No Known Problems Paternal Uncle    • No Known Problems Maternal Grandmother    • No Known Problems Maternal Grandfather    • No Known Problems Paternal Grandmother    • No Known Problems Paternal Grandfather        Social History   Social History     Substance and Sexual Activity   Alcohol Use Never     Social History     Substance and Sexual Activity   Drug Use Never    Comment: none     Social History     Tobacco Use   Smoking Status Former   • Packs/day: 0 25   • Types: Cigarettes   • Quit date: 2022   • Years since quittin 8   Smokeless Tobacco Never         Meds/Allergies    (Not in a hospital admission)     No Known Allergies    OBJECTIVE:  Vitals: Blood pressure 111/74, pulse 78, height 5' 2" (1 575 m), weight 69 2 kg (152 lb 9 6 oz), last menstrual period 2022, not currently breastfeeding  Body mass index is 27 91 kg/m²  Physical Exam  Vitals reviewed  Constitutional:       General: She is not in acute distress  Appearance: Normal appearance  She is not ill-appearing or toxic-appearing  HENT:      Head: Normocephalic and atraumatic  Cardiovascular:      Rate and Rhythm: Normal rate and regular rhythm  Heart sounds: Normal heart sounds  No murmur heard  Pulmonary:      Effort: Pulmonary effort is normal  No respiratory distress  Breath sounds: No wheezing, rhonchi or rales  Abdominal:      General: There is no distension  Palpations: Abdomen is soft  Tenderness: There is no abdominal tenderness  There is no guarding or rebound  Genitourinary:     Comments: Normal external female genitalia  Normal vaginal mucosa  Thin gray white discharge present with odor   Reduced cervical stroma from prior cervical excisional procedures  No cervical lesions noted  10w sized anteverted, mobile uterus  No adnexal masses palapable  Musculoskeletal:         General: No swelling or tenderness  Skin:     General: Skin is warm and dry  Neurological:      Mental Status: She is alert and oriented to person, place, and time     Psychiatric:         Mood and Affect: Mood normal          Behavior: Behavior normal            Lila Navarro MD  12/1/2022  10:34 AM

## 2022-12-12 ENCOUNTER — LAB REQUISITION (OUTPATIENT)
Dept: LAB | Facility: HOSPITAL | Age: 37
End: 2022-12-12

## 2022-12-12 ENCOUNTER — APPOINTMENT (OUTPATIENT)
Dept: LAB | Facility: CLINIC | Age: 37
End: 2022-12-12

## 2022-12-12 DIAGNOSIS — Z01.818 ENCOUNTER FOR OTHER PREPROCEDURAL EXAMINATION: ICD-10-CM

## 2022-12-12 DIAGNOSIS — Z01.818 PRE-OP TESTING: ICD-10-CM

## 2022-12-12 LAB
ABO GROUP BLD: NORMAL
ANION GAP SERPL CALCULATED.3IONS-SCNC: 3 MMOL/L (ref 4–13)
APTT PPP: 27 SECONDS (ref 23–37)
BLD GP AB SCN SERPL QL: NEGATIVE
BUN SERPL-MCNC: 13 MG/DL (ref 5–25)
CALCIUM SERPL-MCNC: 9.2 MG/DL (ref 8.3–10.1)
CHLORIDE SERPL-SCNC: 109 MMOL/L (ref 96–108)
CO2 SERPL-SCNC: 26 MMOL/L (ref 21–32)
CREAT SERPL-MCNC: 0.62 MG/DL (ref 0.6–1.3)
ERYTHROCYTE [DISTWIDTH] IN BLOOD BY AUTOMATED COUNT: 15.8 % (ref 11.6–15.1)
EST. AVERAGE GLUCOSE BLD GHB EST-MCNC: 105 MG/DL
GFR SERPL CREATININE-BSD FRML MDRD: 113 ML/MIN/1.73SQ M
GLUCOSE P FAST SERPL-MCNC: 86 MG/DL (ref 65–99)
HBA1C MFR BLD: 5.3 %
HCT VFR BLD AUTO: 35.7 % (ref 34.8–46.1)
HGB BLD-MCNC: 10.8 G/DL (ref 11.5–15.4)
INR PPP: 1 (ref 0.84–1.19)
MCH RBC QN AUTO: 25.4 PG (ref 26.8–34.3)
MCHC RBC AUTO-ENTMCNC: 30.3 G/DL (ref 31.4–37.4)
MCV RBC AUTO: 84 FL (ref 82–98)
PLATELET # BLD AUTO: 414 THOUSANDS/UL (ref 149–390)
PMV BLD AUTO: 10.8 FL (ref 8.9–12.7)
POTASSIUM SERPL-SCNC: 3.7 MMOL/L (ref 3.5–5.3)
PROTHROMBIN TIME: 13.4 SECONDS (ref 11.6–14.5)
RBC # BLD AUTO: 4.26 MILLION/UL (ref 3.81–5.12)
RH BLD: POSITIVE
SODIUM SERPL-SCNC: 138 MMOL/L (ref 135–147)
SPECIMEN EXPIRATION DATE: NORMAL
WBC # BLD AUTO: 5.15 THOUSAND/UL (ref 4.31–10.16)

## 2022-12-15 NOTE — PRE-PROCEDURE INSTRUCTIONS
Pre-Surgery Instructions:   Medication Instructions   • acetaminophen (TYLENOL) 325 mg tablet Uses PRN- OK to take day of surgery   Have you had / have a sore throat? No  Have you had / have a cough less than 1 week? No  Have you had / have a fever greater than 100 0 - 100  4? No  Are you experiencing any shortness of breath? No    Review with patient via phone medications and showering instructions  Instructed to avoid all ASA and OTC Vit/Supp 1 week prior to surgery and to avoid NSAIDs 3 days prior to surgery per anesthesia instructions  Tylenol ok to take prn  [facility] call with surgery schedule time, nothing eat or drink after midnight  Verbalized understanding

## 2022-12-19 RX ORDER — OXYCODONE HYDROCHLORIDE 5 MG/1
5 TABLET ORAL EVERY 4 HOURS PRN
Status: DISCONTINUED | OUTPATIENT
Start: 2022-12-19 | End: 2022-12-20 | Stop reason: HOSPADM

## 2022-12-19 RX ORDER — IBUPROFEN 600 MG/1
600 TABLET ORAL EVERY 6 HOURS PRN
Status: DISCONTINUED | OUTPATIENT
Start: 2022-12-19 | End: 2022-12-20 | Stop reason: HOSPADM

## 2022-12-19 RX ORDER — ACETAMINOPHEN 325 MG/1
975 TABLET ORAL EVERY 6 HOURS PRN
Status: DISCONTINUED | OUTPATIENT
Start: 2022-12-19 | End: 2022-12-20 | Stop reason: HOSPADM

## 2022-12-20 ENCOUNTER — ANESTHESIA EVENT (OUTPATIENT)
Dept: PERIOP | Facility: HOSPITAL | Age: 37
End: 2022-12-20

## 2022-12-20 ENCOUNTER — HOSPITAL ENCOUNTER (OUTPATIENT)
Facility: HOSPITAL | Age: 37
Setting detail: OUTPATIENT SURGERY
Discharge: HOME | End: 2022-12-20
Attending: OBSTETRICS & GYNECOLOGY | Admitting: OBSTETRICS & GYNECOLOGY

## 2022-12-20 ENCOUNTER — ANESTHESIA (OUTPATIENT)
Dept: PERIOP | Facility: HOSPITAL | Age: 37
End: 2022-12-20

## 2022-12-20 VITALS
OXYGEN SATURATION: 98 % | BODY MASS INDEX: 27.6 KG/M2 | TEMPERATURE: 97.7 F | RESPIRATION RATE: 18 BRPM | HEART RATE: 83 BPM | SYSTOLIC BLOOD PRESSURE: 111 MMHG | HEIGHT: 62 IN | WEIGHT: 150 LBS | DIASTOLIC BLOOD PRESSURE: 72 MMHG

## 2022-12-20 DIAGNOSIS — N93.9 ABNORMAL UTERINE BLEEDING (AUB): ICD-10-CM

## 2022-12-20 PROBLEM — Z90.710 HISTORY OF LAVH: Status: ACTIVE | Noted: 2022-12-20

## 2022-12-20 LAB
B-HCG SERPL-ACNC: <2 MIU/ML
GLUCOSE SERPL-MCNC: 128 MG/DL (ref 65–140)

## 2022-12-20 RX ORDER — MAGNESIUM HYDROXIDE 1200 MG/15ML
LIQUID ORAL AS NEEDED
Status: DISCONTINUED | OUTPATIENT
Start: 2022-12-20 | End: 2022-12-20 | Stop reason: HOSPADM

## 2022-12-20 RX ORDER — PROPOFOL 10 MG/ML
INJECTION, EMULSION INTRAVENOUS CONTINUOUS PRN
Status: DISCONTINUED | OUTPATIENT
Start: 2022-12-20 | End: 2022-12-20

## 2022-12-20 RX ORDER — CEFAZOLIN SODIUM 1 G/3ML
INJECTION, POWDER, FOR SOLUTION INTRAMUSCULAR; INTRAVENOUS AS NEEDED
Status: DISCONTINUED | OUTPATIENT
Start: 2022-12-20 | End: 2022-12-20

## 2022-12-20 RX ORDER — DEXAMETHASONE SODIUM PHOSPHATE 10 MG/ML
INJECTION, SOLUTION INTRAMUSCULAR; INTRAVENOUS AS NEEDED
Status: DISCONTINUED | OUTPATIENT
Start: 2022-12-20 | End: 2022-12-20

## 2022-12-20 RX ORDER — LIDOCAINE HYDROCHLORIDE 10 MG/ML
INJECTION, SOLUTION EPIDURAL; INFILTRATION; INTRACAUDAL; PERINEURAL AS NEEDED
Status: DISCONTINUED | OUTPATIENT
Start: 2022-12-20 | End: 2022-12-20

## 2022-12-20 RX ORDER — ALBUTEROL SULFATE 2.5 MG/3ML
2.5 SOLUTION RESPIRATORY (INHALATION) ONCE AS NEEDED
Status: DISCONTINUED | OUTPATIENT
Start: 2022-12-20 | End: 2022-12-20 | Stop reason: HOSPADM

## 2022-12-20 RX ORDER — ONDANSETRON 2 MG/ML
INJECTION INTRAMUSCULAR; INTRAVENOUS AS NEEDED
Status: DISCONTINUED | OUTPATIENT
Start: 2022-12-20 | End: 2022-12-20

## 2022-12-20 RX ORDER — MIDAZOLAM HYDROCHLORIDE 2 MG/2ML
INJECTION, SOLUTION INTRAMUSCULAR; INTRAVENOUS AS NEEDED
Status: DISCONTINUED | OUTPATIENT
Start: 2022-12-20 | End: 2022-12-20

## 2022-12-20 RX ORDER — PROMETHAZINE HYDROCHLORIDE 25 MG/ML
25 INJECTION, SOLUTION INTRAMUSCULAR; INTRAVENOUS ONCE AS NEEDED
Status: DISCONTINUED | OUTPATIENT
Start: 2022-12-20 | End: 2022-12-20 | Stop reason: HOSPADM

## 2022-12-20 RX ORDER — HYDROMORPHONE HCL/PF 1 MG/ML
0.5 SYRINGE (ML) INJECTION
Status: DISCONTINUED | OUTPATIENT
Start: 2022-12-20 | End: 2022-12-20 | Stop reason: HOSPADM

## 2022-12-20 RX ORDER — ROCURONIUM BROMIDE 10 MG/ML
INJECTION, SOLUTION INTRAVENOUS AS NEEDED
Status: DISCONTINUED | OUTPATIENT
Start: 2022-12-20 | End: 2022-12-20

## 2022-12-20 RX ORDER — KETOROLAC TROMETHAMINE 30 MG/ML
INJECTION, SOLUTION INTRAMUSCULAR; INTRAVENOUS AS NEEDED
Status: DISCONTINUED | OUTPATIENT
Start: 2022-12-20 | End: 2022-12-20

## 2022-12-20 RX ORDER — SODIUM CHLORIDE, SODIUM LACTATE, POTASSIUM CHLORIDE, CALCIUM CHLORIDE 600; 310; 30; 20 MG/100ML; MG/100ML; MG/100ML; MG/100ML
INJECTION, SOLUTION INTRAVENOUS CONTINUOUS PRN
Status: DISCONTINUED | OUTPATIENT
Start: 2022-12-20 | End: 2022-12-20

## 2022-12-20 RX ORDER — METOCLOPRAMIDE HYDROCHLORIDE 5 MG/ML
10 INJECTION INTRAMUSCULAR; INTRAVENOUS ONCE AS NEEDED
Status: DISCONTINUED | OUTPATIENT
Start: 2022-12-20 | End: 2022-12-20 | Stop reason: HOSPADM

## 2022-12-20 RX ORDER — BUPIVACAINE HYDROCHLORIDE 2.5 MG/ML
INJECTION, SOLUTION EPIDURAL; INFILTRATION; INTRACAUDAL AS NEEDED
Status: DISCONTINUED | OUTPATIENT
Start: 2022-12-20 | End: 2022-12-20 | Stop reason: HOSPADM

## 2022-12-20 RX ORDER — CEFAZOLIN SODIUM 2 G/50ML
2000 SOLUTION INTRAVENOUS ONCE
Status: DISCONTINUED | OUTPATIENT
Start: 2022-12-20 | End: 2022-12-20 | Stop reason: HOSPADM

## 2022-12-20 RX ORDER — SODIUM CHLORIDE, SODIUM LACTATE, POTASSIUM CHLORIDE, CALCIUM CHLORIDE 600; 310; 30; 20 MG/100ML; MG/100ML; MG/100ML; MG/100ML
75 INJECTION, SOLUTION INTRAVENOUS CONTINUOUS
Status: DISCONTINUED | OUTPATIENT
Start: 2022-12-20 | End: 2022-12-20 | Stop reason: HOSPADM

## 2022-12-20 RX ORDER — SODIUM CHLORIDE, SODIUM LACTATE, POTASSIUM CHLORIDE, CALCIUM CHLORIDE 600; 310; 30; 20 MG/100ML; MG/100ML; MG/100ML; MG/100ML
125 INJECTION, SOLUTION INTRAVENOUS CONTINUOUS
Status: DISCONTINUED | OUTPATIENT
Start: 2022-12-20 | End: 2022-12-20

## 2022-12-20 RX ORDER — FENTANYL CITRATE/PF 50 MCG/ML
25 SYRINGE (ML) INJECTION
Status: COMPLETED | OUTPATIENT
Start: 2022-12-20 | End: 2022-12-20

## 2022-12-20 RX ORDER — FENTANYL CITRATE 50 UG/ML
INJECTION, SOLUTION INTRAMUSCULAR; INTRAVENOUS AS NEEDED
Status: DISCONTINUED | OUTPATIENT
Start: 2022-12-20 | End: 2022-12-20

## 2022-12-20 RX ORDER — PROPOFOL 10 MG/ML
INJECTION, EMULSION INTRAVENOUS AS NEEDED
Status: DISCONTINUED | OUTPATIENT
Start: 2022-12-20 | End: 2022-12-20

## 2022-12-20 RX ORDER — HYDROMORPHONE HCL/PF 1 MG/ML
SYRINGE (ML) INJECTION AS NEEDED
Status: DISCONTINUED | OUTPATIENT
Start: 2022-12-20 | End: 2022-12-20

## 2022-12-20 RX ADMIN — ROCURONIUM BROMIDE 50 MG: 50 INJECTION INTRAVENOUS at 11:50

## 2022-12-20 RX ADMIN — ONDANSETRON 8 MG: 2 INJECTION INTRAMUSCULAR; INTRAVENOUS at 11:51

## 2022-12-20 RX ADMIN — DEXAMETHASONE SODIUM PHOSPHATE 10 MG: 10 INJECTION, SOLUTION INTRAMUSCULAR; INTRAVENOUS at 11:51

## 2022-12-20 RX ADMIN — HYDROMORPHONE HYDROCHLORIDE 0.5 MG: 1 INJECTION, SOLUTION INTRAMUSCULAR; INTRAVENOUS; SUBCUTANEOUS at 13:49

## 2022-12-20 RX ADMIN — Medication 25 MCG: at 14:08

## 2022-12-20 RX ADMIN — PROPOFOL 200 MG: 10 INJECTION, EMULSION INTRAVENOUS at 11:50

## 2022-12-20 RX ADMIN — Medication 25 MCG: at 14:20

## 2022-12-20 RX ADMIN — HYDROMORPHONE HYDROCHLORIDE 0.5 MG: 1 INJECTION, SOLUTION INTRAMUSCULAR; INTRAVENOUS; SUBCUTANEOUS at 12:44

## 2022-12-20 RX ADMIN — ROCURONIUM BROMIDE 10 MG: 50 INJECTION INTRAVENOUS at 13:02

## 2022-12-20 RX ADMIN — CEFAZOLIN 2000 MG: 1 INJECTION, POWDER, FOR SOLUTION INTRAMUSCULAR; INTRAVENOUS at 11:59

## 2022-12-20 RX ADMIN — KETOROLAC TROMETHAMINE 15 MG: 30 INJECTION, SOLUTION INTRAMUSCULAR at 13:33

## 2022-12-20 RX ADMIN — SODIUM CHLORIDE, SODIUM LACTATE, POTASSIUM CHLORIDE, AND CALCIUM CHLORIDE: .6; .31; .03; .02 INJECTION, SOLUTION INTRAVENOUS at 11:42

## 2022-12-20 RX ADMIN — ROCURONIUM BROMIDE 10 MG: 50 INJECTION INTRAVENOUS at 12:40

## 2022-12-20 RX ADMIN — MIDAZOLAM 2 MG: 1 INJECTION INTRAMUSCULAR; INTRAVENOUS at 11:42

## 2022-12-20 RX ADMIN — FENTANYL CITRATE 50 MCG: 50 INJECTION INTRAMUSCULAR; INTRAVENOUS at 11:50

## 2022-12-20 RX ADMIN — PROPOFOL 30 MCG/KG/MIN: 10 INJECTION, EMULSION INTRAVENOUS at 12:10

## 2022-12-20 RX ADMIN — FENTANYL CITRATE 50 MCG: 50 INJECTION INTRAMUSCULAR; INTRAVENOUS at 12:19

## 2022-12-20 RX ADMIN — SODIUM CHLORIDE, SODIUM LACTATE, POTASSIUM CHLORIDE, AND CALCIUM CHLORIDE: .6; .31; .03; .02 INJECTION, SOLUTION INTRAVENOUS at 13:35

## 2022-12-20 RX ADMIN — OXYCODONE HYDROCHLORIDE 5 MG: 5 TABLET ORAL at 18:38

## 2022-12-20 RX ADMIN — LIDOCAINE HYDROCHLORIDE 50 MG: 10 INJECTION, SOLUTION EPIDURAL; INFILTRATION; INTRACAUDAL; PERINEURAL at 11:50

## 2022-12-20 RX ADMIN — SUGAMMADEX 200 MG: 100 INJECTION, SOLUTION INTRAVENOUS at 13:38

## 2022-12-20 RX ADMIN — SODIUM CHLORIDE, SODIUM LACTATE, POTASSIUM CHLORIDE, AND CALCIUM CHLORIDE: .6; .31; .03; .02 INJECTION, SOLUTION INTRAVENOUS at 11:55

## 2022-12-20 RX ADMIN — HYDROMORPHONE HYDROCHLORIDE 0.5 MG: 1 INJECTION, SOLUTION INTRAMUSCULAR; INTRAVENOUS; SUBCUTANEOUS at 14:33

## 2022-12-20 RX ADMIN — Medication 25 MCG: at 14:04

## 2022-12-20 RX ADMIN — HYDROMORPHONE HYDROCHLORIDE 0.5 MG: 1 INJECTION, SOLUTION INTRAMUSCULAR; INTRAVENOUS; SUBCUTANEOUS at 13:34

## 2022-12-20 RX ADMIN — Medication 25 MCG: at 14:23

## 2022-12-20 NOTE — ANESTHESIA POSTPROCEDURE EVALUATION
Post-Op Assessment Note    CV Status:  Stable  Pain Score: 0    Pain management: adequate     Mental Status:  Alert and awake   Hydration Status:  Euvolemic   PONV Controlled:  Controlled   Airway Patency:  Patent      Post Op Vitals Reviewed: Yes      Staff: Anesthesiologist, CRNA         No notable events documented      BP   102/57   Temp  98 5   Pulse  74   Resp   16   SpO2   100

## 2022-12-20 NOTE — OP NOTE
OPERATIVE REPORT  PATIENT NAME: Darwin Paulson    :  1985  MRN: [id number]  Pt Location: [facility] OR ROOM 03    SURGERY DATE: 2022    Surgeon(s) and Role:     * Yeimy Nunez MD - Primary     * Eber Hurley MD - Assisting     * Ale Paul MD - Assisting    Preop Diagnosis:  Abnormal uterine bleeding (AUB) [N93 9]    Post-Op Diagnosis Codes:     * Abnormal uterine bleeding (AUB) [N93 9]    Procedure(s) (LRB):  HYSTERECTOMY LAPAROSCOPIC ASSISTED VAGINAL (LAVH), LEFT SALPINGECTOMY (Left)    Specimen(s):  ID Type Source Tests Collected by Time Destination   1 : UTERUS, CERVIX, REMNANT LEFT FALLOPIAN TUBE Tissue Soft Tissue, Other TISSUE EXAM Yiemy Nunez MD 2022 1304        Estimated Blood Loss:   450 mL    Drains:  [REMOVED] Urethral Catheter Non-latex 16 Fr  (Removed)   Collection Container Standard drainage bag 22 1228   Number of days: 0       Anesthesia Type:   General    Operative Indications:  Abnormal uterine bleeding (AUB) [N93 9]    Operative Findings:  Grossly normal external female genitalia  Cervix flush with vaginal wall, moderate descensus, evidence of prior surgeries  Anteverted, mobile uterus  Small rectocele appreciated  On laparoscopy, evidence of Sntt-Rnql-Pttomh adhesions by liver  No other intraabdominal adhesive disease  Normal appearance of appendix, normal stomach and bowel otherwise  On pelvic survey, grossly normal, anteverted, enlarged uterus, mobile  Absent bilateral fallopian tubes from prior salpingectomy  Small possible remnant of fallopian tube on left side resected  Grossly normal ovaries bilaterally  Complications:   None    Procedure and Technique:  The patient was taken to the operating room where she was placed under general endotracheal anesthesia without difficulty  Prophylactic Ancef was given per protocol  She was prepped and draped in the usual sterile fashion in the dorsal lithotomy position in low Gerardo stirrups with pink pad for positioning  Care was taken to avoid excessive flexion or extension of the patient's hips and knees and pressure on her extremities  A time out was performed and the above information confirmed      A montenegro catheter was placed in the bladder under aseptic technique  A weighted speculum was inserted into the vagina, and a London retractor was used to visualize the cervix, which was noted to be irregular and with scant tissue due to prior procedures  A tenaculum was placed on the anterior lip of the cervix  A uterine sound was inserted, and easily passed through to the fundus  A dilator was placed in the cervical canal and affixed to the tenaculum for manipulation       The surgeon's gloves were then exchanged, and attention turned to the abdomen  With the patient in the flat position, 5 cc of 0 25% Marcaine was injected into the infraumbilical fold  A 5 mm horizontal incision was made in the umbilical fold  A 5-mm trocar was advanced into the abdomen using direct visualization technique  Intraabdominal placement was confirmed via the laparoscope, and pneumoperitoneum was achieved with CO2 gas  Abdominal survey was completed with findings as above  A lateral port was placed on each side in the following manner: lateral to inferior epigastric vessels, the skin was infiltrated with 5 cc of 0 25% Marcaine, and a 5 mm incision was made  5mm trocars were placed on each side under direct visualization, and intraabdominal placement was confirmed  The patient was placed in Trendelenburg      The uterus was elevated, and pelvic survey was completed with findings as noted above including surgically absent fallopian tubes  There was noted to be possible remnant of fimbriae on left side  These were elevated, and, taking care to avoid ovarian vasculature, the Enseal device was then used to cauterize and transect the remnant for removal  The left round ligament and utero-ovarian ligament were cauterized and divided using the Enseal device   The broad ligament was divided  This was then repeated on the right side  Hemostasis was confirmed  Pneumoperitoneum was released, and the laparoscope was removed from the abdomen, and secured      A weighted speculum was then placed in the vagina, and the single toothed tenaculum was replaced on the cervix by double toothed tenaculums on anterior and posterior lips  The cervix was circumferentially incised with scalpel  Retraction was made more complex by scant cervical tissue, and during this portion of the procedure, small portions of cervix were inadvertently removed due to traction  These were sent with the specimen as a whole  Peanuts were used to bluntly dissect the cervicovaginal plane  Posterior colpotomy was made without difficulty by entering sharply with Zaman scissors, and the posterior peritoneum was tagged with 0-Vicryl suture  Weighted speculum was removed, and a long-billed speculum was placed into the cul-de-sac  The uterosacral ligaments were identified, cauterized and cut with the Enseal device, taking care to evacuate any smoke  The cardinal ligaments were similarly cauterized and cut bilaterally  Anterior peritoneum was inspected, and peritoneal reflection was identified, and the vesiovaginal space was identified and entered sharply with Metzenbaum scissors  A curved London retractor was placed into this space  The remainder of the cardinal ligaments and broad ligaments were cauterized and cut with the Enseal device  The uterus was delivered through the vagina  The pelvis was inspected and all pedicles noted to be hemostatic       On evaluation of the vaginal cuff, bleeding noted from along vaginal cuff  The vaginal cuff was reapproximated with bilateral Figure of Eight stitches at the angles of 0 Vicryl  A second Figure of Eight placed on the left angle to achieve hemostasis  A separate running suture of 0-Vicryl incorporating the peritoneum was used to close the remainder of the cuff     Surgeon’s gloves were changed, and attention was then turned back to the abdomen where the laparoscope was reintroduced and pneumoperitoneum achieved  The pelvis was inspected  All pedicles were hemostatic  The lateral ports were removed under direct visualization  The pneumoperitoneum was released and umbilical port removed  The port sites were closed with skin glue      The patient tolerated the procedure well and was transferred to the recovery room in stable condition   All needle, sponge, and instrument counts were noted to be correct x 2 at the end of the procedure       Dr Suri Dalton was present and participated in all key portions of the procedure    Patient Disposition:  PACU  and extubated and stable        SIGNATURE: Ian Hui MD  DATE: December 20, 2022  TIME: 2:11 PM

## 2022-12-20 NOTE — ANESTHESIA PREPROCEDURE EVALUATION
Procedure:  HYSTERECTOMY LAPAROSCOPIC TOTAL (LTH)B/L SALPINGECTOMY  ALL OTHER INDICATED PROCEDURES (Bilateral: Abdomen)  HYSTERECTOMY LAPAROSCOPIC ASSISTED VAGINAL (LAVH) (Abdomen)  LAPAROTOMY EXPLORATORY (Abdomen)    Relevant Problems   No relevant active problems        Physical Exam    Airway    Mallampati score: I  TM Distance: >3 FB  Neck ROM: full     Dental   No notable dental hx     Cardiovascular  Cardiovascular exam normal    Pulmonary  Pulmonary exam normal     Other Findings        Anesthesia Plan  ASA Score- 2     Anesthesia Type- general with ASA Monitors  Additional Monitors:   Airway Plan: ETT  Plan Factors-Exercise tolerance (METS): >4 METS  Chart reviewed  Existing labs reviewed  Patient summary reviewed  Patient is not a current smoker  Induction- intravenous  Postoperative Plan- Plan for postoperative opioid use  Planned trial extubation    Informed Consent- Anesthetic plan and risks discussed with patient  I personally reviewed this patient with the CRNA  Discussed and agreed on the Anesthesia Plan with the JALEN Franco

## 2023-01-03 ENCOUNTER — OFFICE VISIT (OUTPATIENT)
Dept: OBGYN CLINIC | Facility: CLINIC | Age: 38
End: 2023-01-03

## 2023-01-03 VITALS
HEART RATE: 82 BPM | SYSTOLIC BLOOD PRESSURE: 116 MMHG | HEIGHT: 62 IN | WEIGHT: 152 LBS | DIASTOLIC BLOOD PRESSURE: 85 MMHG | BODY MASS INDEX: 27.97 KG/M2

## 2023-01-03 DIAGNOSIS — Z90.710 HISTORY OF LAVH: ICD-10-CM

## 2023-01-03 DIAGNOSIS — Z09 POSTOP CHECK: Primary | ICD-10-CM

## 2023-01-03 NOTE — ASSESSMENT & PLAN NOTE
Doing well postoperatively  Encourage use of simethicone for possible gas retention  Continue lifting restrictions <10 lbs  Reviewed pelvic rest for another 4 weeks until after postop appointment

## 2023-01-03 NOTE — PROGRESS NOTES
OB/GYN VISIT  [address]  1/3/2023  10:40 AM      Assessment/Plan:    Problem List Items Addressed This Visit        Other    History of LAVH     Doing well postoperatively  Encourage use of simethicone for possible gas retention  Continue lifting restrictions <10 lbs  Reviewed pelvic rest for another 4 weeks until after postop appointment        Other Visit Diagnoses     Postop check    -  Primary            Postop LAVH    Subjective:     [address] is a 42 y o  [id number] female who presents for two week postoperative evaluation following uncomplicated LAVH  Patient reports feeling well since surgery  She has had some intermittent abdominal discomfort/tightness which is brief and resolves spontaneously  Reports regular bladder and bowel function  Pain is currently controlled  She is driving  No longer taking narcotic medications  She had been lifting some large cases of water  Has been adherent to pelvic rest  Denies vaginal bleeding or malodorous discharge  Objective:    Vitals: Blood pressure 116/85, pulse 82, height 5' 2" (1 575 m), weight 68 9 kg (152 lb), not currently breastfeeding  Body mass index is 27 8 kg/m²      Past Medical History:   Diagnosis Date   • Abnormal cells of cervix    • Abnormal cytological finding in specimen from cervix 4/5/2022   • ALVERTO III (cervical intraepithelial neoplasia III)    • Headache    • Varicella      Past Surgical History:   Procedure Laterality Date   • CERVICAL BIOPSY Bilateral 8/12/2022    Procedure: BIOPSY CONE/COLD KNIFE CERVIX;  Surgeon: Genna Lozano MD;  Location: [facility];  Service: Gynecology   • OR COLPOSCOPY CERVIX VAG LOOP [address] CERVIX N/A 06/22/2017    Procedure: BIOPSY LEEP CERVIX;  Surgeon: Britt Tracy MD;  Location[facility];  Service: Gynecology   • OR HYSTEROSCOPY BX ENDOMETRIUM&/POLYPC W/WO D&C N/A 8/12/2022    Procedure: DILATATION AND CURETTAGE (D&C) WITH HYSTEROSCOPY;  Surgeon: Genna Lozano MD;  Location[facility];  Service: Gynecology • DC LAPAROSCOPY W/RMVL ADNEXAL STRUCTURES Bilateral 06/22/2017    Procedure: LAPAROSCOPIC SALPINGECTOMY;  Surgeon: Mendel Rao, MD;  [facility];  Service: Gynecology   • DC LAPS VAGINAL HYSTERECTOMY UTERUS 250 GM/< Left 12/20/2022    Procedure: HYSTERECTOMY LAPAROSCOPIC ASSISTED VAGINAL (LAVH), LEFT SALPINGECTOMY;  Surgeon: Linda Lazcano MD;  [facility];  Service: Gynecology   • TUBAL LIGATION         Physical Exam  Vitals reviewed  Constitutional:       General: She is not in acute distress  Appearance: Normal appearance  She is well-developed and normal weight  She is not ill-appearing or toxic-appearing  HENT:      Head: Normocephalic and atraumatic  Eyes:      General: No scleral icterus  Conjunctiva/sclera: Conjunctivae normal    Cardiovascular:      Rate and Rhythm: Normal rate  Pulmonary:      Effort: Pulmonary effort is normal  No respiratory distress  Abdominal:      General: There is no distension  Palpations: Abdomen is soft  There is no mass  Tenderness: There is no abdominal tenderness  There is no guarding or rebound  Comments: Incisions c/d/i   Skin:     General: Skin is warm and dry  Neurological:      General: No focal deficit present  Mental Status: She is alert and oriented to person, place, and time     Psychiatric:         Mood and Affect: Mood normal          Behavior: Behavior normal            Miguelangel Chisholm MD  1/3/2023  10:40 AM

## 2023-02-07 ENCOUNTER — HOSPITAL ENCOUNTER (EMERGENCY)
Facility: HOSPITAL | Age: 38
Discharge: HOME | End: 2023-02-07
Attending: EMERGENCY MEDICINE | Admitting: EMERGENCY MEDICINE

## 2023-02-07 ENCOUNTER — APPOINTMENT (EMERGENCY)
Dept: RADIOLOGY | Facility: HOSPITAL | Age: 38
End: 2023-02-07

## 2023-02-07 VITALS
HEART RATE: 70 BPM | OXYGEN SATURATION: 99 % | RESPIRATION RATE: 17 BRPM | DIASTOLIC BLOOD PRESSURE: 66 MMHG | SYSTOLIC BLOOD PRESSURE: 129 MMHG | TEMPERATURE: 98.5 F

## 2023-02-07 DIAGNOSIS — M25.572 ACUTE LEFT ANKLE PAIN: Primary | ICD-10-CM

## 2023-02-07 NOTE — DISCHARGE INSTRUCTIONS
Diagnosis;  left medial ankle pain     - activity as tolerated -- can wear  walking boot as needed    - for pain- over the counter generic tylenol 500 mg- together with over the counter generic ibuprofen 400 mg- 4 times per day with meal/liquids    - if no improvement after 1 week - please call the foot/ankle doctor to schedule an appointment     - please return to  the er for any left ankle/left lower leg swelling/redness or any new/ worseninfg/concerning symptoms to you

## 2023-02-07 NOTE — ED PROVIDER NOTES
History  Chief Complaint   Patient presents with   • Foot Pain     Pt presents to the ED with c/o left foot pain  No numbness, tingling, injury  With certain movements, pain shoots to knee  42 yr female with no injury -- c/o onset today of medial left ankle pain today pain starts in ankle and shots up into left lower leg-- no injury - no fevers/' rash / other bone/jt pain swelling-- no lle weakness/claudication symptoms- no hx of vte       History provided by:  Patient   used: No        Prior to Admission Medications   Prescriptions Last Dose Informant Patient Reported?  Taking?   acetaminophen (TYLENOL) 325 mg tablet   No No   Sig: Take 2 tablets (650 mg total) by mouth every 6 (six) hours as needed for mild pain   Patient not taking: Reported on 1/3/2023      Facility-Administered Medications: None       Past Medical History:   Diagnosis Date   • Abnormal cells of cervix    • Abnormal cytological finding in specimen from cervix 4/5/2022   • ALVERTO III (cervical intraepithelial neoplasia III)    • Headache    • Varicella        Past Surgical History:   Procedure Laterality Date   • CERVICAL BIOPSY Bilateral 8/12/2022    Procedure: BIOPSY CONE/COLD KNIFE CERVIX;  Surgeon: Romi Rushing MD;  [facility];  Service: Gynecology   • KS COLPOSCOPY CERVIX VAG LOOP [address] N/A 06/22/2017    Procedure: BIOPSY LEEP CERVIX;  Surgeon: Mike Benson MD;  [facility];  Service: Gynecology   • KS HYSTEROSCOPY BX ENDOMETRIUM&/POLYPC W/WO D&C N/A 8/12/2022    Procedure: DILATATION AND CURETTAGE (D&C) WITH HYSTEROSCOPY;  Surgeon: Romi Rushing MD;  [facility];  Service: Gynecology   • KS LAPAROSCOPY W/RMVL ADNEXAL STRUCTURES Bilateral 06/22/2017    Procedure: LAPAROSCOPIC SALPINGECTOMY;  Surgeon: Mike Benson MD;  [facility];  Service: Gynecology   • KS LAPS VAGINAL HYSTERECTOMY UTERUS 250 GM/< Left 12/20/2022    Procedure: HYSTERECTOMY LAPAROSCOPIC ASSISTED VAGINAL (LAVH), LEFT SALPINGECTOMY;  Surgeon: Karel Samano MD;  [facility];  Service: Gynecology   • TUBAL LIGATION         Family History   Problem Relation Age of Onset   • No Known Problems Mother    • No Known Problems Father    • No Known Problems Sister    • No Known Problems Brother    • No Known Problems Daughter    • No Known Problems Son    • No Known Problems Maternal Aunt    • No Known Problems Maternal Uncle    • No Known Problems Paternal Aunt    • No Known Problems Paternal Uncle    • No Known Problems Maternal Grandmother    • No Known Problems Maternal Grandfather    • No Known Problems Paternal Grandmother    • No Known Problems Paternal Grandfather      I have reviewed and agree with the history as documented  E-Cigarette/Vaping   • E-Cigarette Use Never User      E-Cigarette/Vaping Substances   • Nicotine No    • THC No    • CBD No    • Flavoring No    • Other No    • Unknown No      Social History     Tobacco Use   • Smoking status: Former     Packs/day: 0 25     Types: Cigarettes     Quit date: 2022     Years since quittin 0   • Smokeless tobacco: Never   Vaping Use   • Vaping Use: Never used   Substance Use Topics   • Alcohol use: Never   • Drug use: Never     Comment: none       Review of Systems   Constitutional: Negative  HENT: Negative  Eyes: Negative  Respiratory: Negative  Cardiovascular: Negative  Gastrointestinal: Negative  Endocrine: Negative  Genitourinary: Negative  Musculoskeletal: Negative  Left medial ankle pain    Skin: Negative  Allergic/Immunologic: Negative  Neurological: Negative  Hematological: Negative  Psychiatric/Behavioral: Negative  Physical Exam  Physical Exam  Vitals and nursing note reviewed  Constitutional:       General: She is not in acute distress  Appearance: Normal appearance  She is not ill-appearing, toxic-appearing or diaphoretic        Comments: avss--  Pulse ox 99 % on ra- interpretation is normal- no intervetnion    Musculoskeletal:         General: Tenderness present  No swelling, deformity or signs of injury  Normal range of motion  Right lower leg: No edema  Left lower leg: No edema  Comments: lle- nt at hip/ pelvis/ inguinal area /knee-  Lower leg- high ankle- normal achilles tendon function - no lle edema/calf tenderness/asym/ erythema- left medial posterior malleolar tenderness only - no ankle jt effusion - left foot- nt- normal distal pulse-sensation/strength/rom cap refill    Neurological:      Mental Status: She is alert  Vital Signs  ED Triage Vitals [02/07/23 0914]   Temperature Pulse Respirations Blood Pressure SpO2   98 5 °F (36 9 °C) 76 14 136/65 100 %      Temp src Heart Rate Source Patient Position - Orthostatic VS BP Location FiO2 (%)   -- Monitor -- Right arm --      Pain Score       8           Vitals:    02/07/23 0914 02/07/23 1130   BP: 136/65 129/66   Pulse: 76 70         Visual Acuity      ED Medications  Medications - No data to display    Diagnostic Studies  Results Reviewed     None                 XR ankle 3+ views LEFT   Final Result by Shawna Kapoor MD (02/07 1536)      No acute osseous abnormality              Workstation performed: [id number]                    Procedures  Procedures         ED Course  ED Course as of 02/07/23 1715   Tue Feb 07, 2023   1035 Left ankle xray - no fx/ bony lesions   1120 Er md note-  wells score for dvt of 0- er md clinical suspicion of  lle dvt is very low-- this discussed with pt    1125 - er md note- pt placed in walking boot  able to tolerate ambulation will d/c                                             Medical Decision Making  After h and p- er md doubt any lle vascular pad/vte -- has no jt effusion/ lle edema/ erythema with normal pulses- will order xray to look for any bone cyst/tumor- thought doubt     Acute left ankle pain: complicated acute illness or injury     Details: see above- pt will need xray -   Amount and/or Complexity of Data Reviewed  Radiology: ordered  Details: reviewed by er md   Discussion of management or test interpretation with external provider(s): mldl amount of er md thought complexity/ er workup- xrays reviewed with pt- pt placed in walking boot     Risk  OTC drugs  Risk Details: See chart and above        Disposition  Final diagnoses:   Acute left ankle pain     Time reflects when diagnosis was documented in both MDM as applicable and the Disposition within this note     Time User Action Codes Description Comment    2/7/2023 11:22 AM Calleen Gaurang Add [M25 [id number]] Acute left ankle pain       ED Disposition     ED Disposition   Discharge    Condition   Stable    Date/Time   Tue Feb 7, 2023 11:22 AM    Comment   [address] discharge to home/self care  Follow-up Information     Follow up With Specialties Details Why Contact Info    [facility] (OUT[facility]IENT [facility]) Call  As needed [address] [place] [address]  [phone]            Discharge Medication List as of 2/7/2023 11:29 AM      CONTINUE these medications which have NOT CHANGED    Details   acetaminophen (TYLENOL) 325 mg tablet Take 2 tablets (650 mg total) by mouth every 6 (six) hours as needed for mild pain, Starting Mon 11/21/2022, Normal             No discharge procedures on file      PDMP Review     None          ED Provider  Electronically Signed by           Rosie Corona MD  02/07/23 6101

## 2023-02-13 ENCOUNTER — OFFICE VISIT (OUTPATIENT)
Dept: OBGYN CLINIC | Facility: CLINIC | Age: 38
End: 2023-02-13

## 2023-02-13 VITALS
BODY MASS INDEX: 28.89 KG/M2 | SYSTOLIC BLOOD PRESSURE: 115 MMHG | HEART RATE: 77 BPM | DIASTOLIC BLOOD PRESSURE: 69 MMHG | WEIGHT: 157 LBS | HEIGHT: 62 IN

## 2023-02-13 DIAGNOSIS — Z90.710 HISTORY OF LAVH: Primary | ICD-10-CM

## 2023-02-13 NOTE — PROGRESS NOTES
Postoperative evaluation  Alana Garrison 42 y o  female MRN: [id number]  Unit/Bed#:  Encounter: [id number]    Subjective     Alana Garrison is a 42 y o  y o  female [id number] who presents for a postoperative visit  She is 6 weeks post Pamula Frisk on 12/20/22 in setting of AUB    Anesthesia: general  Postoperatory course has been uncomplicated  Bleeding no bleeding  Bowel function is normal  Bladder function is normal  Patient is not sexually active  Denies vaginal discharge  Incision dry  Review of Systems  Pertinent items are noted in HPI       Objective     /64   Wt 59 4 kg (131 lb)   BMI 22 49 kg/m²    General:   appears stated age, cooperative, alert normal mood and affect   Neck: Neck: normal, supple,trachea midline, no masses   Heart: regular rate and rhythm, S1, S2 normal, no murmur, click, rub or gallop   Lungs: clear to auscultation bilaterally   Breasts: deferred   Abdomen: soft, non-tender, without masses or organomegaly, incision clean/dry, close   Vulva: normal   Vagina: normal vagina   Urethra: normal   Cervix: Normal, no discharge  absent, vaginal cuff closed  no bleeding    Uterus: uterus absent   Adnexa: normal adnexa     Assessment/Plan       Patient in late postoperative period, after LATVH on 12/20/22  · Adequate healing process in abdominal incisions, lifting restrictions can be more moderate at this time  · Surgical pathology discussed with patient :  Uterus, cervix, remnant left fallopian tube: Disordered proliferative endometrium with adenomyosis and submucosal leiomyoma, 2 8 cm in greatest dimension  Benign cervix with no residual dysplasia  No fallopian tube tissue identified (Hx of salpingectomy)  · Vaginal cuff evaluated today, no concerns, normal healing process, no cuff defects present  · No vaginal bleeding, no foul smelling  · She is  sexually active since 1 week ago, denies pain or bleeding   We have discussed patient she is ok to discontinue pelvic rest at this time  · Follow-up in annual examination or before if patient desired previous examination      Patient D/W Samir Justice MD

## 2023-04-25 ENCOUNTER — ANNUAL EXAM (OUTPATIENT)
Dept: OBGYN CLINIC | Facility: CLINIC | Age: 38
End: 2023-04-25

## 2023-04-25 VITALS
BODY MASS INDEX: 27.71 KG/M2 | HEIGHT: 62 IN | DIASTOLIC BLOOD PRESSURE: 79 MMHG | SYSTOLIC BLOOD PRESSURE: 115 MMHG | WEIGHT: 150.6 LBS | HEART RATE: 67 BPM

## 2023-04-25 DIAGNOSIS — S93.401A RIGHT ANKLE SPRAIN: ICD-10-CM

## 2023-04-25 DIAGNOSIS — Z12.31 ENCOUNTER FOR SCREENING MAMMOGRAM FOR MALIGNANT NEOPLASM OF BREAST: Primary | ICD-10-CM

## 2023-04-25 DIAGNOSIS — Z01.419 ENCOUNTER FOR ANNUAL ROUTINE GYNECOLOGICAL EXAMINATION: ICD-10-CM

## 2023-04-25 RX ORDER — ACETAMINOPHEN 325 MG/1
650 TABLET ORAL EVERY 6 HOURS PRN
Qty: 30 TABLET | Refills: 0 | Status: SHIPPED | OUTPATIENT
Start: 2023-04-25

## 2023-04-25 NOTE — PROGRESS NOTES
[address] EXAM    SUBJECTIVE:  Daniel Sharma is a 42 y o  [id number] female who presents for annual visit  Last visit 2/13/23  Her concerns today include: none  Gyn ROS:  Today she denies all of the following:  • vaginal discharge, labial erythema or lesions, dyspareunia  • dysuria, hematuria, urinary frequency/urgency, flank pain, incontinence    • breast mass, skin changes, dimpling, reddening, nipple retraction, discharge     Review of other systems negative      Past Medical History:   Diagnosis Date   • Abnormal cells of cervix    • Abnormal cytological finding in specimen from cervix 4/5/2022   • ALVERTO III (cervical intraepithelial neoplasia III)    • Headache    • Varicella        Past Surgical History:   Procedure Laterality Date   • CERVICAL BIOPSY Bilateral 8/12/2022    Procedure: BIOPSY CONE/COLD KNIFE CERVIX;  Surgeon: Nickolas Catalan MD;  [facility];  Service: Gynecology   • DE COLPOSCOPY CERVIX VAG LOOP ELTRD [address] CERVIX N/A 06/22/2017    Procedure: BIOPSY LEEP CERVIX;  Surgeon: Kira Houston MD;  [facility];  Service: Gynecology   • DE HYSTEROSCOPY BX ENDOMETRIUM&/POLYPC W/WO D&C N/A 8/12/2022    Procedure: DILATATION AND CURETTAGE (D&C) WITH HYSTEROSCOPY;  Surgeon: Nickolas Catalan MD;  [facility];  Service: Gynecology   • DE LAPAROSCOPY W/RMVL ADNEXAL STRUCTURES Bilateral 06/22/2017    Procedure: LAPAROSCOPIC SALPINGECTOMY;  Surgeon: Kira Houston MD;  [facility];  Service: Gynecology   • DE LAPS VAGINAL HYSTERECTOMY UTERUS 250 GM/< Left 12/20/2022    Procedure: HYSTERECTOMY LAPAROSCOPIC ASSISTED VAGINAL (LAVH), LEFT SALPINGECTOMY;  Surgeon: Krzysztof Wyatt MD;  [facility];  Service: Gynecology   • TUBAL LIGATION            Current Outpatient Medications:   •  acetaminophen (TYLENOL) 325 mg tablet, Take 2 tablets (650 mg total) by mouth every 6 (six) hours as needed for mild pain (Patient not taking: Reported on 1/3/2023), Disp: 30 tablet, Rfl: 0    Gyn History:  · S/p cold knife cone biopsy on 22 for cervical dysplasia  · S/p LAVH, LS on 22 for AUB  · Denies post-surgical vaginal bleeding   · Patient is not currently sexually active   · Denies history of STIs  · Contraception: post-hysterectomy       Obstetric History:   OB History    Para Term  AB Living   6 6 6 0 0 6   SAB IAB Ectopic Multiple Live Births   0 0 0 0 6      # Outcome Date GA Lbr Kwadwo/2nd Weight Sex Delivery Anes PTL Lv   6 Term 16 37w0d  2705 g (5 lb 15 4 oz) F Vag-Spont EPI  EDUARDO   5 Term 05 40w0d  3062 g (6 lb 12 oz) M Vag-Spont EPI  EDUARDO   4 Term 03 40w0d  3742 g (8 lb 4 oz) M Vag-Spont None  EDUARDO   3 Term 02 40w0d  3147 g (6 lb 15 oz) M Vag-Spont None  EDUARDO   2 Term 10/28/98 40w0d  3062 g (6 lb 12 oz) F Vag-Spont EPI  EDUARDO   1 Term 97 40w0d  2948 g (6 lb 8 oz) F Vag-Spont EPI  EDUARDO       · Family Planning: post-hysterectomy     Social History:   Social History     Socioeconomic History   • Marital status: Single     Spouse name: None   • Number of children: None   • Years of education: None   • Highest education level: None   Occupational History   • None   Tobacco Use   • Smoking status: Former     Packs/day: 0 25     Types: Cigarettes     Quit date: 2022     Years since quittin 2   • Smokeless tobacco: Never   Vaping Use   • Vaping Use: Never used   Substance and Sexual Activity   • Alcohol use: Never   • Drug use: Never     Comment: none   • Sexual activity: Not Currently     Partners: Male     Birth control/protection: None, Surgical   Other Topics Concern   • None   Social History Narrative   • None     Social Determinants of Health     Financial Resource Strain: Low Risk    • Difficulty of Paying Living Expenses: Not hard at all   Food Insecurity: No Food Insecurity   • Worried About Running Out of Food in the Last Year: Never true   • Ran Out of Food in the Last Year: Never true   Transportation Needs: No Transportation Needs   • Lack of Transportation (Medical): No   • Lack of Transportation (Non-Medical): No   Physical Activity: Not on file   Stress: Not on file   Social Connections: Not on file   Intimate Partner Violence: Not on file   Housing Stability: Low Risk    • Unable to Pay for Housing in the Last Year: No   • Number of Places Lived in the Last Year: 1   • Unstable Housing in the Last Year: No     Well-Woman Screenings:  • Abnormal Pap History:   o 3/1/2016: Pap - HSIL, HPV other HR +  o 2017: colpo - ALVERTO 3   o 2017: BTL, LEEP - ALVERTO II-III, margins +   o 3/14/22: Pap - HSIL, HPV other HR +   o 22: colpo - ALVERTO 3  o 22: CKC - ALVERTO 3, margins negative   o 22: hysterectomy - benign cervix with no residual dysplasia  • Last mammogram: Due this year  • Last colonoscopy: not yet due   • DEXA scan: not yet due   • STI screening: declines testing today  • No personal/family history of breast, endometrial, ovarian, cervical, or colon cancer  • HPV vaccination: previously vaccinated      PHQ-2/9 Depression Screening    Little interest or pleasure in doing things: 0 - not at all  Feeling down, depressed, or hopeless: 1 - several days  Trouble falling or staying asleep, or sleeping too much: 3 - nearly every day  Feeling tired or having little energy: 0 - not at all  Poor appetite or overeatin - more than half the days  Feeling bad about yourself - or that you are a failure or have let yourself or your family down: 0 - not at all  Trouble concentrating on things, such as reading the newspaper or watching television: 0 - not at all  Moving or speaking so slowly that other people could have noticed   Or the opposite - being so fidgety or restless that you have been moving around a lot more than usual: 0 - not at all  Thoughts that you would be better off dead, or of hurting yourself in some way: 0 - not at all  PHQ-2 Score: 1  PHQ-2 Interpretation: Negative depression screen  PHQ-9 "Score: 6   PHQ-9 Interpretation: Mild depression          OBJECTIVE  Vitals:    04/25/23 0956   BP: 115/79   BP Location: Right arm   Patient Position: Sitting   Cuff Size: Adult   Pulse: 67   Weight: 68 3 kg (150 lb 9 6 oz)   Height: 5' 2\" (1 575 m)       Physical Exam  Constitutional:       General: She is not in acute distress  Appearance: She is well-developed  HENT:      Head: Normocephalic and atraumatic  Eyes:      Conjunctiva/sclera: Conjunctivae normal    Cardiovascular:      Rate and Rhythm: Normal rate  Pulmonary:      Effort: Pulmonary effort is normal    Chest:   Breasts:     Right: Normal       Left: Normal    Abdominal:      General: There is no distension  Palpations: Abdomen is soft  Tenderness: There is no abdominal tenderness  Genitourinary:     Comments: Normal appearing external genitalia, vaginal mucosa, and vaginal cuff  Surgically absent uterus and cervix  No evidence of vaginal bleeding  Normal physiologic discharge in the vaginal vault  On bimanual exam, smooth, intact vaginal cuff with no irregularities or lesions, and no palpable adnexal masses  No CMT  Musculoskeletal:         General: Normal range of motion  Skin:     General: Skin is warm and dry  Neurological:      General: No focal deficit present  Mental Status: She is alert and oriented to person, place, and time  Mental status is at baseline  Psychiatric:         Mood and Affect: Mood normal          Behavior: Behavior normal          Thought Content: Thought content normal           ASSESSMENT:  Dagmar Essex is a 42 y o  [id number] female who presents for annual visit  Doing well  Normal gynecologic exam  Vaginal cuff healed well  Pap screening updated today  Due to hx of cervical dysplasia, will need Pap smear screening of the vaginal cuff for at least 20 years  Pathology of the cervix from hysterectomy noted no residual dysplasia which was reassuring        PLAN:  - Vaginal pap smear collected  - " Patient instructed to return for any future vaginal bleeding as this is abnormal s/p hysterectomy   - Mammogram ordered  - RTC 1 year for next annual     Ora MD Alf  PGY-4, OBGYN  04/25/23

## 2023-04-27 LAB
HPV HR 12 DNA CVX QL NAA+PROBE: NEGATIVE
HPV16 DNA CVX QL NAA+PROBE: NEGATIVE
HPV18 DNA CVX QL NAA+PROBE: NEGATIVE

## 2023-05-02 LAB
LAB AP GYN PRIMARY INTERPRETATION: NORMAL
Lab: NORMAL

## 2023-05-08 ENCOUNTER — TELEPHONE (OUTPATIENT)
Dept: OBGYN CLINIC | Facility: CLINIC | Age: 38
End: 2023-05-08

## 2023-05-08 NOTE — TELEPHONE ENCOUNTER
----- Message from Sanket Christensen MD sent at 5/5/2023  4:31 PM EDT -----  Patient's pap smear was negative  Please notify

## 2023-09-13 ENCOUNTER — OFFICE VISIT (OUTPATIENT)
Dept: OBGYN CLINIC | Facility: CLINIC | Age: 38
End: 2023-09-13

## 2023-09-13 VITALS
SYSTOLIC BLOOD PRESSURE: 122 MMHG | DIASTOLIC BLOOD PRESSURE: 82 MMHG | WEIGHT: 152.8 LBS | HEIGHT: 62 IN | BODY MASS INDEX: 28.12 KG/M2 | HEART RATE: 71 BPM

## 2023-09-13 DIAGNOSIS — N30.01 ACUTE CYSTITIS WITH HEMATURIA: Primary | ICD-10-CM

## 2023-09-13 LAB
SL AMB  POCT GLUCOSE, UA: ABNORMAL
SL AMB LEUKOCYTE ESTERASE,UA: ABNORMAL
SL AMB POCT BILIRUBIN,UA: ABNORMAL
SL AMB POCT BLOOD,UA: ABNORMAL
SL AMB POCT CLARITY,UA: CLEAR
SL AMB POCT COLOR,UA: YELLOW
SL AMB POCT KETONES,UA: ABNORMAL
SL AMB POCT NITRITE,UA: POSITIVE
SL AMB POCT PH,UA: 5
SL AMB POCT SPECIFIC GRAVITY,UA: 1.02
SL AMB POCT URINE PROTEIN: ABNORMAL
SL AMB POCT UROBILINOGEN: 0.2

## 2023-09-13 PROCEDURE — 81002 URINALYSIS NONAUTO W/O SCOPE: CPT | Performed by: NURSE PRACTITIONER

## 2023-09-13 PROCEDURE — 99213 OFFICE O/P EST LOW 20 MIN: CPT | Performed by: NURSE PRACTITIONER

## 2023-09-13 PROCEDURE — 87086 URINE CULTURE/COLONY COUNT: CPT | Performed by: NURSE PRACTITIONER

## 2023-09-13 PROCEDURE — 87186 SC STD MICRODIL/AGAR DIL: CPT | Performed by: NURSE PRACTITIONER

## 2023-09-13 PROCEDURE — 87077 CULTURE AEROBIC IDENTIFY: CPT | Performed by: NURSE PRACTITIONER

## 2023-09-13 RX ORDER — NITROFURANTOIN 25; 75 MG/1; MG/1
100 CAPSULE ORAL 2 TIMES DAILY
Qty: 10 CAPSULE | Refills: 0 | Status: SHIPPED | OUTPATIENT
Start: 2023-09-13 | End: 2023-09-18

## 2023-09-13 NOTE — PROGRESS NOTES
PROBLEM GYNECOLOGICAL VISIT    Kimmy Toledo is a 34 y.o. female who presents today with complaint of possible UTI.   Her general medical history has been reviewed and she reports it as follows:    Past Medical History:   Diagnosis Date   • Abnormal cells of cervix    • Abnormal cytological finding in specimen from cervix 2022   • ALVERTO III (cervical intraepithelial neoplasia III)    • Headache    • Varicella      Past Surgical History:   Procedure Laterality Date   • CERVICAL BIOPSY Bilateral 2022    Procedure: BIOPSY CONE/COLD KNIFE CERVIX;  Surgeon: Dani Joyner MD;  [facility];  Service: Gynecology   • SC COLPOSCOPY CERVIX VAG LOOP ELTRD  N  St N/A 2017    Procedure: BIOPSY LEEP CERVIX;  Surgeon: Chino Cruz MD;  [facility];  Service: Gynecology   • SC HYSTEROSCOPY BX ENDOMETRIUM&/POLYPC W/WO D&C N/A 2022    Procedure: DILATATION AND CURETTAGE (D&C) WITH HYSTEROSCOPY;  Surgeon: Dani Joyner MD;  [facility];  Service: Gynecology   • SC LAPAROSCOPY W/RMVL ADNEXAL STRUCTURES Bilateral 2017    Procedure: LAPAROSCOPIC SALPINGECTOMY;  Surgeon: Chino Cruz MD;  [facility];  Service: Gynecology   • SC LAPS VAGINAL HYSTERECTOMY UTERUS 250 GM/< Left 2022    Procedure: HYSTERECTOMY LAPAROSCOPIC ASSISTED VAGINAL (LAVH), LEFT SALPINGECTOMY;  Surgeon: Donald Burgos MD;  [facility];  Service: Gynecology   • TUBAL LIGATION       OB History        6    Para   6    Term   6       0    AB   0    Living   6       SAB   0    IAB   0    Ectopic   0    Multiple   0    Live Births   6               Social History     Tobacco Use   • Smoking status: Former     Packs/day: 0.25     Types: Cigarettes     Quit date: 2022     Years since quittin.6   • Smokeless tobacco: Never   Vaping Use   • Vaping Use: Never used   Substance Use Topics   • Alcohol use: Never   • Drug use: Never     Comment: none     Social History     Substance and Sexual Activity   Sexual Activity Yes   • Partners: Male   • Birth control/protection: Surgical, None       Current Outpatient Medications   Medication Instructions   • acetaminophen (TYLENOL) 650 mg, Oral, Every 6 hours PRN   • nitrofurantoin (MACROBID) 100 mg, Oral, 2 times daily       History of Present Illness:   Reyna Fernandes presents today reporting a sudden onset of urinary frequency, voiding small amounts at at time, the sensation of bladder fullness and burning after urination. This began last night. She noticed some small drops of blood in her urine this morning. She denies any vaginal discharge or irritation. She denies any fever or flank pain. Review of Systems:  Review of Systems   Constitutional: Negative. Gastrointestinal: Negative. Genitourinary: Positive for dysuria, frequency and urgency. Negative for flank pain and vaginal discharge. Physical Exam:  /82 (BP Location: Right arm)   Pulse 71   Ht 5' 2" (1.575 m)   Wt 69.3 kg (152 lb 12.8 oz)   LMP 11/19/2022 (Approximate)   BMI 27.95 kg/m²   Physical Exam  Constitutional:       General: She is not in acute distress. Appearance: Normal appearance. Abdominal:      Tenderness: There is no right CVA tenderness or left CVA tenderness. Neurological:      Mental Status: She is alert. Skin:     General: Skin is warm and dry. Psychiatric:         Mood and Affect: Mood normal.         Behavior: Behavior normal.   Vitals reviewed. Point of Care Testing:   -urine dipstick: +nitrites, large blood     Assessment:   1. Acute cystits    Plan:   1. Rx for Macrobid. 2. Urine culture collected. 3. Warning signs reviewed. 4, Return for annual exam or sooner if needed. Reviewed with patient that [facility] immediately, but that they will not necessarily be reviewed by me immediately. Explained that I will review results at my earliest opportunity and contact patient appropriately.

## 2023-09-17 LAB — BACTERIA UR CULT: ABNORMAL

## 2024-01-18 ENCOUNTER — OFFICE VISIT (OUTPATIENT)
Dept: OBGYN CLINIC | Facility: CLINIC | Age: 39
End: 2024-01-18

## 2024-01-18 DIAGNOSIS — Z11.3 SCREENING EXAMINATION FOR STD (SEXUALLY TRANSMITTED DISEASE): Primary | ICD-10-CM

## 2024-01-18 DIAGNOSIS — N89.8 VAGINAL DISCHARGE: ICD-10-CM

## 2024-01-18 DIAGNOSIS — B37.31 VULVOVAGINAL CANDIDIASIS: ICD-10-CM

## 2024-01-18 LAB
BV WHIFF TEST VAG QL: NEGATIVE
CLUE CELLS SPEC QL WET PREP: NEGATIVE
PH SMN: 3.5 [PH]
SL AMB POCT WET MOUNT: ABNORMAL
T VAGINALIS VAG QL WET PREP: ABNORMAL
YEAST VAG QL WET PREP: POSITIVE

## 2024-01-18 PROCEDURE — 99213 OFFICE O/P EST LOW 20 MIN: CPT | Performed by: NURSE PRACTITIONER

## 2024-01-18 PROCEDURE — 87491 CHLMYD TRACH DNA AMP PROBE: CPT | Performed by: NURSE PRACTITIONER

## 2024-01-18 PROCEDURE — 87210 SMEAR WET MOUNT SALINE/INK: CPT | Performed by: NURSE PRACTITIONER

## 2024-01-18 PROCEDURE — 87591 N.GONORRHOEAE DNA AMP PROB: CPT | Performed by: NURSE PRACTITIONER

## 2024-01-18 RX ORDER — FLUCONAZOLE 150 MG/1
150 TABLET ORAL
Qty: 2 TABLET | Refills: 0 | Status: SHIPPED | OUTPATIENT
Start: 2024-01-18 | End: 2024-01-22

## 2024-01-18 NOTE — PROGRESS NOTES
PROBLEM GYNECOLOGICAL VISIT    Bharti Li is a 38 y.o. female who presents today with complaint of vaginal discharge.  Her general medical history has been reviewed and she reports it as follows:    Past Medical History:   Diagnosis Date    Abnormal cells of cervix     Abnormal cytological finding in specimen from cervix 2022    ALVERTO III (cervical intraepithelial neoplasia III)     Headache     Varicella      Past Surgical History:   Procedure Laterality Date    CERVICAL BIOPSY Bilateral 2022    Procedure: BIOPSY CONE/COLD KNIFE CERVIX;  Surgeon: Joss Ramon MD;  [facility];  Service: Gynecology    IA COLPOSCOPY CERVIX VAG LOOP ELTRD BX CERVIX N/A 2017    Procedure: BIOPSY LEEP CERVIX;  Surgeon: He Brian MD;  [facility];  Service: Gynecology    IA HYSTEROSCOPY BX ENDOMETRIUM&/POLYPC W/WO D&C N/A 2022    Procedure: DILATATION AND CURETTAGE (D&C) WITH HYSTEROSCOPY;  Surgeon: Joss Ramon MD;  [facility];  Service: Gynecology    IA LAPAROSCOPY W/RMVL ADNEXAL STRUCTURES Bilateral 2017    Procedure: LAPAROSCOPIC SALPINGECTOMY;  Surgeon: He Brian MD;  [facility];  Service: Gynecology    IA LAPS VAGINAL HYSTERECTOMY UTERUS 250 GM/< Left 2022    Procedure: HYSTERECTOMY LAPAROSCOPIC ASSISTED VAGINAL (LAVH), LEFT SALPINGECTOMY;  Surgeon: Connor Aggarwal MD;  [facility];  Service: Gynecology    TUBAL LIGATION       OB History          6    Para   6    Term   6       0    AB   0    Living   6         SAB   0    IAB   0    Ectopic   0    Multiple   0    Live Births   6               Social History     Tobacco Use    Smoking status: Former     Current packs/day: 0.00     Types: Cigarettes     Quit date: 2022     Years since quittin.9    Smokeless tobacco: Never   Vaping Use    Vaping status: Never Used   Substance Use Topics    Alcohol use: Never    Drug use: Never     Comment: none     Social History  "    Substance and Sexual Activity   Sexual Activity Yes    Partners: Male    Birth control/protection: Surgical, Female Sterilization       Current Outpatient Medications   Medication Instructions    acetaminophen (TYLENOL) 650 mg, Oral, Every 6 hours PRN       History of Present Illness:   Bharti presents today reporting a few week history of an excessive amount of white vaginal discharge. She denies any odor or irritation. No new sexual partners. Has recently used new scented soaps and does report washing internal vaginal canal.     Review of Systems:  Review of Systems   Constitutional: Negative.    Gastrointestinal: Negative.    Genitourinary:  Positive for vaginal discharge.       Physical Exam:  Ht 5' 2\" (1.575 m)   Wt 65.5 kg (144 lb 6.4 oz)   LMP 11/19/2022 (Approximate)   BMI 26.41 kg/m²   Physical Exam  Constitutional:       General: She is not in acute distress.     Appearance: Normal appearance.   Genitourinary:      Vulva normal.      Vaginal exam comments: Scant amount of thick white discharge .   Neurological:      Mental Status: She is alert.   Skin:     General: Skin is warm and dry.   Psychiatric:         Mood and Affect: Mood normal.         Behavior: Behavior normal.   Vitals reviewed.         Point of Care Testing:   -Wet mount: +yeast, -clue cells, -trich    -KOH mount: +yeast   -Whiff: negative    -pH 3.5  Assessment:   1. Vulvovaginal candidiasis    2. STI screening     Plan:   1. Patient reports washing internal vaginal canal immediately prior to appt, so there is a very scant amount of discharge to evaluate on physical exam. Wet mount +yeast.    2. Reviewed vulvar skin care measures. Encouraged to dc use of scented soaps and washing internally.    3. Rx for Diflucan.    4. STI screening collected.    5. Return for annual exam     Reviewed with patient that [facility] immediately, but that they will not necessarily be reviewed by me immediately.  Explained that I " will review results at my earliest opportunity and contact patient appropriately.

## 2024-01-19 VITALS
HEART RATE: 69 BPM | HEIGHT: 62 IN | WEIGHT: 144.4 LBS | SYSTOLIC BLOOD PRESSURE: 115 MMHG | BODY MASS INDEX: 26.57 KG/M2 | DIASTOLIC BLOOD PRESSURE: 79 MMHG

## 2024-01-19 LAB
C TRACH DNA SPEC QL NAA+PROBE: NEGATIVE
N GONORRHOEA DNA SPEC QL NAA+PROBE: NEGATIVE

## 2024-01-19 NOTE — RESULT ENCOUNTER NOTE
Spoke with patient to confirm neg sti test results. Patient had not seen results on MyChart but stated understanding and had no further questions.

## 2024-04-26 ENCOUNTER — ANNUAL EXAM (OUTPATIENT)
Dept: OBGYN CLINIC | Facility: CLINIC | Age: 39
End: 2024-04-26

## 2024-04-26 VITALS
WEIGHT: 146.4 LBS | BODY MASS INDEX: 26.94 KG/M2 | SYSTOLIC BLOOD PRESSURE: 102 MMHG | DIASTOLIC BLOOD PRESSURE: 72 MMHG | HEIGHT: 62 IN | HEART RATE: 70 BPM

## 2024-04-26 DIAGNOSIS — N39.3 STRESS INCONTINENCE, FEMALE: ICD-10-CM

## 2024-04-26 DIAGNOSIS — Z11.3 SCREEN FOR STD (SEXUALLY TRANSMITTED DISEASE): Primary | ICD-10-CM

## 2024-04-26 DIAGNOSIS — Z12.4 CERVICAL CANCER SCREENING: ICD-10-CM

## 2024-04-26 DIAGNOSIS — Z01.419 WOMEN'S ANNUAL ROUTINE GYNECOLOGICAL EXAMINATION: ICD-10-CM

## 2024-04-26 DIAGNOSIS — Z12.31 ENCOUNTER FOR SCREENING MAMMOGRAM FOR MALIGNANT NEOPLASM OF BREAST: ICD-10-CM

## 2024-04-26 PROCEDURE — 88175 CYTOPATH C/V AUTO FLUID REDO: CPT | Performed by: NURSE PRACTITIONER

## 2024-04-26 PROCEDURE — 87591 N.GONORRHOEAE DNA AMP PROB: CPT | Performed by: NURSE PRACTITIONER

## 2024-04-26 PROCEDURE — 87491 CHLMYD TRACH DNA AMP PROBE: CPT | Performed by: NURSE PRACTITIONER

## 2024-04-26 PROCEDURE — 87624 HPV HI-RISK TYP POOLED RSLT: CPT | Performed by: NURSE PRACTITIONER

## 2024-04-26 PROCEDURE — 99396 PREV VISIT EST AGE 40-64: CPT | Performed by: NURSE PRACTITIONER

## 2024-04-26 NOTE — PROGRESS NOTES
ANNUAL GYNECOLOGICAL EXAMINATION    Bharti Li is a 38 y.o. female who presents today for annual GYN exam.  Her last pap smear was performed 4/2023 and result was NILM, HR-HPV-.  She reports a history of abnormal pap smears in her past. She has a history of a cold knife cone biopsy 8/2022. She then had a LAVH 12/2022 for AUB. She has received the HPV vaccine series. She has not yet had her first mammogram. She had HIV screening performed in 2022 and it was negative. Patient's last menstrual period was 11/19/2022 (approximate). She reports complaints of urinary leaking with coughing, sneezing, laughing etc. Denies any symptoms of UTI.      Her abnormal pap history is as follows:  3/1/2016: Pap - HSIL, HPV other HR +  2/8/2017: colpo - ALVERTO 3   6/22/2017: BTL, LEEP - ALVERTO II-III, margins +   3/14/22: Pap - HSIL, HPV other HR +   4/5/22: colpo - ALVERTO 3  8/12/22: CKC - ALVERTO 3, margins negative   12/20/22: hysterectomy - benign cervix with no residual dysplasia    Her general medical history has been reviewed and she reports it as follows:    Past Medical History:   Diagnosis Date    Abnormal cells of cervix     Abnormal Pap History:  -3/1/2016: Pap - HSIL, HPV other HR + -2/8/2017: colpo - ALVERTO 3  -6/22/2017: BTL, LEEP - ALVERTO II-III, margins +  -3/14/22: Pap - HSIL, HPV other HR +  -4/5/22: colpo - ALVERTO 3 -8/12/22: CKC - ALVERTO 3, margins negative  -12/20/22: hysterectomy - benign cervix with no residual dysplasia    Abnormal cytological finding in specimen from cervix 04/05/2022    ALVERTO III (cervical intraepithelial neoplasia III)     Headache     Varicella      Past Surgical History:   Procedure Laterality Date    CERVICAL BIOPSY Bilateral 8/12/2022    Procedure: BIOPSY CONE/COLD KNIFE CERVIX;  Surgeon: Joss Ramon MD;  [facility];  Service: Gynecology    AR COLPOSCOPY CERVIX VAG LOOP ELTRD BX CERVIX N/A 06/22/2017    Procedure: BIOPSY LEEP CERVIX;  Surgeon: He Brian MD;  [facility];  Service:  "Gynecology    AL HYSTEROSCOPY BX ENDOMETRIUM&/POLYPC W/WO D&C N/A 2022    Procedure: DILATATION AND CURETTAGE (D&C) WITH HYSTEROSCOPY;  Surgeon: Joss Ramon MD;  [facility];  Service: Gynecology    AL LAPAROSCOPY W/RMVL ADNEXAL STRUCTURES Bilateral 2017    Procedure: LAPAROSCOPIC SALPINGECTOMY;  Surgeon: He Brian MD;  [facility];  Service: Gynecology    AL LAPS VAGINAL HYSTERECTOMY UTERUS 250 GM/< Left 2022    Procedure: HYSTERECTOMY LAPAROSCOPIC ASSISTED VAGINAL (LAVH), LEFT SALPINGECTOMY;  Surgeon: Connor Aggarwal MD;  [facility];  Service: Gynecology    TUBAL LIGATION       OB History          6    Para   6    Term   6       0    AB   0    Living   6         SAB   0    IAB   0    Ectopic   0    Multiple   0    Live Births   6               Social History     Tobacco Use    Smoking status: Former     Current packs/day: 0.00     Types: Cigarettes     Quit date: 2022     Years since quittin.2    Smokeless tobacco: Never   Vaping Use    Vaping status: Never Used   Substance Use Topics    Alcohol use: Never    Drug use: Never     Comment: none     Social History     Substance and Sexual Activity   Sexual Activity Yes    Partners: Male    Birth control/protection: Surgical, Female Sterilization     Cancer-related family history includes Cervical cancer in her mother.    Current Outpatient Medications   Medication Instructions    acetaminophen (TYLENOL) 650 mg, Oral, Every 6 hours PRN       Review of Systems:  Review of Systems   Constitutional: Negative.    Gastrointestinal: Negative.    Genitourinary: Negative.    Skin: Negative.        Physical Exam:  /72 (BP Location: Right arm, Patient Position: Sitting)   Pulse 70   Ht 5' 2\" (1.575 m)   Wt 66.4 kg (146 lb 6.4 oz)   LMP 2022 (Approximate)   BMI 26.78 kg/m²   Physical Exam  Constitutional:       General: She is not in acute distress.     Appearance: Normal appearance. "   Genitourinary:      Vulva and bladder normal.      No lesions in the vagina.      Vaginal cuff intact.     No vaginal erythema or ulceration.        Right Adnexa: not tender and no mass present.     Left Adnexa: not tender and no mass present.     Cervix is absent.      Uterus is absent.   Breasts:     Right: No mass, nipple discharge or skin change.      Left: No mass, nipple discharge or skin change.   Cardiovascular:      Rate and Rhythm: Normal rate and regular rhythm.   Pulmonary:      Effort: Pulmonary effort is normal.      Breath sounds: Normal breath sounds.   Abdominal:      General: Abdomen is flat.      Palpations: Abdomen is soft.   Musculoskeletal:      Cervical back: Neck supple.   Neurological:      Mental Status: She is alert.   Skin:     General: Skin is warm and dry.   Psychiatric:         Mood and Affect: Mood normal.         Behavior: Behavior normal.   Vitals reviewed.         Assessment/Plan:   1. Normal well-woman GYN exam.  2. Cervical cancer screening:  Normal vaginal cuff. Vaginal pap smear done with HPV co-testing. Reviewed ASCCP guidelines, will need continued vaginal pap smears post hysterectomy due to history of CIN3. Pap today if normal will be 2 consecutive years post last ALVERTO 3, will return to normal screening if again negative today. Has received HPV vaccine in the past.   3. STD screening: Orders placed for vaginal GC/CT cultures.  Orders placed for serum anti-HIV, anti-HCV, HbsAg, syphilis panel.   4. Breast cancer screening:  Normal breast exam.  Order placed for bilateral screening mammogram.  Reviewed breast self-awareness.   5. Depression Screening: Patient's depression screening was assessed with a PHQ-2 score of 0. Their PHQ-9 score was 1. Clinically patient does not have depression. No treatment is required.     6. BMI Counseling: Body mass index is 26.78 kg/m². Discussed the patient's BMI with her. The BMI is above normal. Exercise recommendations include exercising 3-5  times per week.   7. Return to office in one year for annual exam or sooner if needed.    Reviewed with patient that test results are available in [email or web address] immediately, but that they will not necessarily be reviewed by me immediately.  Explained that I will review results at my earliest opportunity and contact patient appropriately.

## 2024-04-29 LAB
C TRACH DNA SPEC QL NAA+PROBE: NEGATIVE
HPV HR 12 DNA CVX QL NAA+PROBE: NEGATIVE
HPV16 DNA CVX QL NAA+PROBE: NEGATIVE
HPV18 DNA CVX QL NAA+PROBE: NEGATIVE
N GONORRHOEA DNA SPEC QL NAA+PROBE: NEGATIVE

## 2024-05-03 LAB
LAB AP GYN PRIMARY INTERPRETATION: NORMAL
Lab: NORMAL

## 2024-05-06 ENCOUNTER — TELEPHONE (OUTPATIENT)
Dept: OBGYN CLINIC | Facility: CLINIC | Age: 39
End: 2024-05-06

## 2024-05-06 NOTE — TELEPHONE ENCOUNTER
Patient informed of negative pap smear and sti testing, patient verbalized understanding and had no further questions.    ----- Message from DOROTEO Do sent at 5/6/2024  7:51 AM EDT -----  Please let her know the pap and sti testing is negative. Thank you!

## 2024-09-06 ENCOUNTER — HOSPITAL ENCOUNTER (OUTPATIENT)
Dept: MAMMOGRAPHY | Facility: HOSPITAL | Age: 39
Discharge: HOME | End: 2024-09-06
Payer: COMMERCIAL

## 2024-09-06 VITALS — BODY MASS INDEX: 26.87 KG/M2 | HEIGHT: 62 IN | WEIGHT: 146 LBS

## 2024-09-06 DIAGNOSIS — Z12.31 ENCOUNTER FOR SCREENING MAMMOGRAM FOR MALIGNANT NEOPLASM OF BREAST: ICD-10-CM

## 2024-09-06 PROCEDURE — 77067 SCR MAMMO BI INCL CAD: CPT

## 2024-09-06 PROCEDURE — 77063 BREAST TOMOSYNTHESIS BI: CPT

## 2024-10-11 NOTE — RESULT NOTES
Verified Results  (1) TISSUE EXAM [id number] 08:33AM Aaron Montaño     Test Name Result Flag Reference   LAB AP CASE REPORT (Report)     Surgical Pathology Report             Case: [id number]                   Authorizing Provider: Jeny Milian MD     Collected:      06/22/2017 1952        Ordering Location:   [address] [place] [address]   Received:      06/22/2017 2 [facility] Operating Room                            Pathologist:      Miguel Narayanan MD                                 Specimens:  A) - Fallopian Tubes, Bilateral                                    B) - Cervix, Leep                                           C) - Cervix, Endocervical, ECC   LAB AP FINAL DIAGNOSIS (Report)     A  Fallopian tubes (bilateral salpingectomy):  - Complete cross-section of bilateral fallopian tubes  - Paratubal cysts  - No carcinoma identified     B  Cervix (LEEP):  - High grade squamous intraepithelial lesion (ALVERTO II-III) involving   endocervical glands  - High grade dysplasia is present at margin of resection    Comment: The margin involved by high grade dysplasia cannot be   distinguished as to endocervical versus exocervical margin  C  Endocervix (curettage):  - High grade squamous intraepithelial lesion (ALVERTO II-III)  - Predominately benign secretory endometrium with stromal breakdown  - Scant benign endocervical glands    Comment: P16 shows diffuse staining in the fragments of high grade   squamous dysplasia  All controls performed with the immunohistochemical stains reported above   reacted appropriately  Interpretation performed at [facility], [address] [place] [address] 18    Electronically signed by Miguel Narayanan MD on 6/27/2017 at 11:46 AM   LAB AP SURGICAL ADDITIONAL INFORMATION (Report)     These tests were developed and their performance characteristics   determined by [facility] or [facility]   They may not be cleared or approved by the [place]  Food and   Drug Administration  The FDA has determined that such clearance or   approval is not necessary  These tests are used for clinical purposes  They should not be regarded as investigational or for research  This   laboratory has been approved by CLIA 88, designated as a high-complexity   laboratory and is qualified to perform these tests  LAB AP GROSS DESCRIPTION (Report)     A  The specimen is received in formalin, labeled with the patient's name   and medical record number, and is designated fallopian tubes, bilateral     The specimen consists of 2 fallopian tubes each with fimbriated ends,   one measuring 5 0 cm in length by 0 5 cm in average diameter which   exhibits peritubular cysts measuring up to 0 3 cm in greatest dimensions  The other fallopian tube measures 6 5 cm in length by 0 5 cm in average   diameter and appears unremarkable  Representative sections are submitted   as follows:  1:1 fallopian tube  2: Other fallopian tube  B  The specimen is received in formalin, labeled with the patient's name   and medical record number, and is designated  cervix, LEEP    The   specimen consists of 2 irregular pink red-brown unorientated tissue   fragments 2 2 x 2 0 x 1 6 cm in aggregate dimension  Rougher appearing   surfaces are inked blue  After attempted reconstruction one edge is   arbitrarily designated as 12:00  Entirely submitted  One cassette  1: Cervix, 12-3 o'clock quadrant  2: Cervix, 3-6 o'clock quadrant  3: Cervix, 6-9 o'clock quadrant  4: Cervix, 9-12 o'clock quadrant  C  The specimen is received in formalin, labeled with the patient's name   and medical record number, and is designated ECC   The specimen   consists of several irregular rubbery and soft red tan-pink tissue   fragments measuring 2 6 x 2 2 x 0 4 cm in aggregate dimension  Entirely   submitted  One cassette       Note: The estimated total formalin fixation time based upon information   provided by the submitting clinician and the standard processing schedule   is 11 5 hours      Joey Velez Pt will require a rolling walker at home to complete the MRADLs.

## 2024-11-05 ENCOUNTER — HOSPITAL ENCOUNTER (OUTPATIENT)
Dept: ULTRASOUND IMAGING | Facility: CLINIC | Age: 39
Discharge: HOME | End: 2024-11-05
Payer: COMMERCIAL

## 2024-11-05 ENCOUNTER — HOSPITAL ENCOUNTER (OUTPATIENT)
Dept: MAMMOGRAPHY | Facility: CLINIC | Age: 39
Discharge: HOME | End: 2024-11-05
Payer: COMMERCIAL

## 2024-11-05 VITALS — WEIGHT: 146 LBS | BODY MASS INDEX: 26.87 KG/M2 | HEIGHT: 62 IN

## 2024-11-05 DIAGNOSIS — R92.8 ABNORMAL SCREENING MAMMOGRAM: ICD-10-CM

## 2024-11-05 PROCEDURE — 77065 DX MAMMO INCL CAD UNI: CPT

## 2024-11-05 PROCEDURE — 76642 ULTRASOUND BREAST LIMITED: CPT

## 2024-11-05 PROCEDURE — G0279 TOMOSYNTHESIS, MAMMO: HCPCS

## 2024-12-26 ENCOUNTER — HOSPITAL ENCOUNTER (EMERGENCY)
Facility: HOSPITAL | Age: 39
Discharge: HOME | End: 2024-12-26
Attending: EMERGENCY MEDICINE
Payer: COMMERCIAL

## 2024-12-26 VITALS
HEART RATE: 67 BPM | RESPIRATION RATE: 20 BRPM | TEMPERATURE: 97.9 F | DIASTOLIC BLOOD PRESSURE: 72 MMHG | OXYGEN SATURATION: 97 % | SYSTOLIC BLOOD PRESSURE: 118 MMHG

## 2024-12-26 DIAGNOSIS — R21 RASH: Primary | ICD-10-CM

## 2024-12-26 PROCEDURE — 99284 EMERGENCY DEPT VISIT MOD MDM: CPT | Performed by: EMERGENCY MEDICINE

## 2024-12-26 PROCEDURE — 99282 EMERGENCY DEPT VISIT SF MDM: CPT

## 2024-12-26 RX ORDER — PREDNISONE 20 MG/1
40 TABLET ORAL ONCE
Status: COMPLETED | OUTPATIENT
Start: 2024-12-26 | End: 2024-12-26

## 2024-12-26 RX ORDER — PREDNISONE 20 MG/1
40 TABLET ORAL DAILY
Status: DISCONTINUED | OUTPATIENT
Start: 2024-12-27 | End: 2024-12-26

## 2024-12-26 RX ORDER — PREDNISONE 20 MG/1
40 TABLET ORAL DAILY
Qty: 8 TABLET | Refills: 0 | Status: SHIPPED | OUTPATIENT
Start: 2024-12-27 | End: 2024-12-31

## 2024-12-26 RX ADMIN — PREDNISONE 40 MG: 20 TABLET ORAL at 16:35

## 2024-12-26 NOTE — ED PROVIDER NOTES
Time reflects when diagnosis was documented in both MDM as applicable and the Disposition within this note       Time User Action Codes Description Comment    12/26/2024  4:27 PM Quintin Cooney Add [R21] Rash           ED Disposition       ED Disposition   Discharge    Condition   Stable    Date/Time   u Dec 26, 2024  4:27 PM    Comment   Bharti Li discharge to home/self care.                   Assessment & Plan       Medical Decision Making  Risk  Prescription drug management.      Patient is a 39-year-old female presenting here today due to a rash.  On physical exam, the rash is maculopapular in nature and does not bother her at all with itching or pain.    Differentials that have for this patient include pityriasis rosacea, and contact dermatitis.  For this patient, I have given her a 5-day course of prednisone, a follow-up with dermatology.  Patient was discharged with good return precautions.         Medications   predniSONE tablet 40 mg (40 mg Oral Given 12/26/24 1635)       ED Risk Strat Scores                          SBIRT 20yo+      Flowsheet Row Most Recent Value   Initial Alcohol Screen: US AUDIT-C     1. How often do you have a drink containing alcohol? 0 Filed at: 12/26/2024 1603   2. How many drinks containing alcohol do you have on a typical day you are drinking?  0 Filed at: 12/26/2024 1603   3a. Male UNDER 65: How often do you have five or more drinks on one occasion? 0 Filed at: 12/26/2024 1603   3b. FEMALE Any Age, or MALE 65+: How often do you have 4 or more drinks on one occassion? 0 Filed at: 12/26/2024 1603   Audit-C Score 0 Filed at: 12/26/2024 1603   JAMA: How many times in the past year have you...    Used an illegal drug or used a prescription medication for non-medical reasons? Never Filed at: 12/26/2024 1603                            History of Present Illness       Chief Complaint   Patient presents with    Rash     Started two days ago with rash on abdomen, now spread to back.  Denies new soaps, detergent, lotions, etc.        Past Medical History:   Diagnosis Date    Abnormal cells of cervix     Abnormal Pap History:  -3/1/2016: Pap - HSIL, HPV other HR + -2/8/2017: colpo - ALVERTO 3  -6/22/2017: BTL, LEEP - ALVERTO II-III, margins +  -3/14/22: Pap - HSIL, HPV other HR +  -4/5/22: colpo - ALVERTO 3 -8/12/22: CKC - ALVERTO 3, margins negative  -12/20/22: hysterectomy - benign cervix with no residual dysplasia    Abnormal cytological finding in specimen from cervix 04/05/2022    ALVERTO III (cervical intraepithelial neoplasia III)     Headache     Varicella       Past Surgical History:   Procedure Laterality Date    CERVICAL BIOPSY Bilateral 8/12/2022    Procedure: BIOPSY CONE/COLD KNIFE CERVIX;  Surgeon: Joss Ramon MD;  [facility]: [facility];  Service: Gynecology    NY COLPOSCOPY CERVIX VAG LOOP ELTRD BX CERVIX N/A 06/22/2017    Procedure: BIOPSY LEEP CERVIX;  Surgeon: He Brian MD;  [facility];  Service: Gynecology    NY HYSTEROSCOPY BX ENDOMETRIUM&/POLYPC W/WO D&C N/A 8/12/2022    Procedure: DILATATION AND CURETTAGE (D&C) WITH HYSTEROSCOPY;  Surgeon: Joss Ramon MD;  [facility];  Service: Gynecology    NY LAPAROSCOPY W/RMVL ADNEXAL STRUCTURES Bilateral 06/22/2017    Procedure: LAPAROSCOPIC SALPINGECTOMY;  Surgeon: He Brian MD;  [facility];  Service: Gynecology    NY LAPS VAGINAL HYSTERECTOMY UTERUS 250 GM/< Left 12/20/2022    Procedure: HYSTERECTOMY LAPAROSCOPIC ASSISTED VAGINAL (LAVH), LEFT SALPINGECTOMY;  Surgeon: Connor Aggarwal MD;  [facility];  Service: Gynecology    TUBAL LIGATION        Family History   Problem Relation Age of Onset    Cervical cancer Mother     Hypertension Father     Hyperlipidemia Father     Heart block Father     No Known Problems Sister     No Known Problems Daughter     No Known Problems Daughter     No Known Problems Daughter     No Known Problems Maternal Grandmother     No Known Problems Maternal Grandfather     No  Known Problems Paternal Grandmother     No Known Problems Paternal Grandfather     No Known Problems Brother     No Known Problems Brother     No Known Problems Son     No Known Problems Maternal Aunt     Cervical cancer Maternal Aunt     Cervical cancer Maternal Aunt     Cervical cancer Maternal Aunt     No Known Problems Maternal Uncle     No Known Problems Paternal Aunt     No Known Problems Paternal Aunt     No Known Problems Paternal Aunt     No Known Problems Paternal Aunt     No Known Problems Paternal Aunt     No Known Problems Paternal Aunt     Heart attack Paternal Uncle       Social History     Tobacco Use    Smoking status: Former     Current packs/day: 0.00     Types: Cigarettes     Quit date: 2022     Years since quittin.9    Smokeless tobacco: Never   Vaping Use    Vaping status: Never Used   Substance Use Topics    Alcohol use: Never    Drug use: Never     Comment: none      E-Cigarette/Vaping    E-Cigarette Use Never User       E-Cigarette/Vaping Substances    Nicotine No     THC No     CBD No     Flavoring No     Other No     Unknown No       I have reviewed and agree with the history as documented.     Patient is a 39-year-old female with no pertinent past medical history who presents here today due to a rash.  Patient states the rash started on December 15 and she describes the rash as starting on her trunk and slowly spreading to her extremities.  She says that the rash is not painful, is not itchy, and she has no other symptoms.  Patient denies headaches, changes in vision, chest pain, abdominal pain, diarrhea, constipation, or swelling anywhere.  Patient also denies changes in soaps, detergents or any skin products.  Of note, patient does have a recent travel history to [place] and she just got back on December 10.      Rash  Associated symptoms: no abdominal pain, no fever, no joint pain, no shortness of breath, no sore throat and not vomiting        Review of Systems    Constitutional:  Negative for chills and fever.   HENT:  Negative for ear pain and sore throat.    Eyes:  Negative for pain and visual disturbance.   Respiratory:  Negative for cough and shortness of breath.    Cardiovascular:  Negative for chest pain and palpitations.   Gastrointestinal:  Negative for abdominal pain and vomiting.   Genitourinary:  Negative for dysuria and hematuria.   Musculoskeletal:  Negative for arthralgias and back pain.   Skin:  Positive for rash. Negative for color change, pallor and wound.   Neurological:  Negative for seizures and syncope.   All other systems reviewed and are negative.          Objective       ED Triage Vitals   Temperature Pulse Blood Pressure Respirations SpO2 Patient Position - Orthostatic VS   12/26/24 1458 12/26/24 1456 12/26/24 1456 12/26/24 1456 12/26/24 1456 12/26/24 1456   97.9 °F (36.6 °C) 67 118/72 20 97 % Sitting      Temp Source Heart Rate Source BP Location FiO2 (%) Pain Score    12/26/24 1458 12/26/24 1456 12/26/24 1456 -- --    Oral Monitor Left arm        Vitals      Date and Time Temp Pulse SpO2 Resp BP Pain Score FACES Pain Rating User   12/26/24 1458 97.9 °F (36.6 °C) -- -- -- -- -- -- ST   12/26/24 1456 -- 67 97 % 20 118/72 -- -- ST            Physical Exam  Vitals and nursing note reviewed.   Constitutional:       General: She is not in acute distress.     Appearance: She is well-developed.   HENT:      Head: Normocephalic and atraumatic.   Eyes:      Conjunctiva/sclera: Conjunctivae normal.   Cardiovascular:      Rate and Rhythm: Normal rate and regular rhythm.      Heart sounds: No murmur heard.  Pulmonary:      Effort: Pulmonary effort is normal. No respiratory distress.      Breath sounds: Normal breath sounds.   Abdominal:      Palpations: Abdomen is soft.      Tenderness: There is no abdominal tenderness.   Musculoskeletal:         General: No swelling.      Cervical back: Neck supple.   Skin:     General: Skin is warm and dry.      Capillary  Refill: Capillary refill takes less than 2 seconds.      Findings: Rash present.      Comments: Rash that's maculopapular on trunk and back and spreading outwards. Refer to picture in chart   Neurological:      Mental Status: She is alert.   Psychiatric:         Mood and Affect: Mood normal.         Results Reviewed       None            No orders to display       Procedures    ED Medication and Procedure Management   Prior to Admission Medications   Prescriptions Last Dose Informant Patient Reported? Taking?   acetaminophen (TYLENOL) 325 mg tablet   No No   Sig: Take 2 tablets (650 mg total) by mouth every 6 (six) hours as needed for mild pain, headaches or fever   Patient not taking: Reported on 1/18/2024      Facility-Administered Medications: None     Discharge Medication List as of 12/26/2024  4:37 PM        START taking these medications    Details   predniSONE 20 mg tablet Take 2 tablets (40 mg total) by mouth daily for 4 days Do not start before December 27, 2024., Starting Fri 12/27/2024, Until Tue 12/31/2024, Normal           CONTINUE these medications which have NOT CHANGED    Details   acetaminophen (TYLENOL) 325 mg tablet Take 2 tablets (650 mg total) by mouth every 6 (six) hours as needed for mild pain, headaches or fever, Starting Tue 4/25/2023, Normal             ED SEPSIS DOCUMENTATION   Time reflects when diagnosis was documented in both MDM as applicable and the Disposition within this note       Time User Action Codes Description Comment    12/26/2024  4:27 PM Quintin Cooney Add [R21] Amberly Cooney MD  12/26/24 4938

## 2024-12-26 NOTE — DISCHARGE INSTRUCTIONS
Dear Bharti,    You were seen at the [facility] urgent department for a rash.  While you were here, we gave you prednisone, and I am sending you home with prescription for prednisone and a referral to dermatology.  Please make an appointment with them as soon as possible to have your rash evaluated.    If you have any concerning symptoms such as ulcerations of your skin, itchiness that does not subside with over-the-counter medications, or any other concerning symptoms, please come back to the emergency room.    Thank you for trusting us with your care.

## 2024-12-26 NOTE — ED ATTENDING ATTESTATION
I, Nik Bermudez MD, saw and evaluated the patient. I have discussed the patient with the resident and agree with the resident's findings, Plan of Care, and MDM as documented in the resident's note, except where noted. All available labs and Radiology studies were reviewed.  I was present for key portions of any procedure(s) performed by the resident and I was immediately available to provide assistance.    At this point I agree with the current assessment done in the Emergency Department.  I have conducted an independent evaluation of this patient a history and physical is as follows:    38 yo female    ROS: per resident physician note    Gen: NAD, AA&Ox3  HEENT: PERRL, EOMI  Neck: supple  CV: RRR  Lungs: CTA B/L  Abdomen: soft, NT/ND  Ext: no swelling or deformity  Neuro: 5/5 strength all extremities, sensation grossly intact  Skin: no rash    ED Course        Critical Care Time  Procedures

## 2025-01-02 ENCOUNTER — OFFICE VISIT (OUTPATIENT)
Age: 40
End: 2025-01-02
Payer: COMMERCIAL

## 2025-01-02 VITALS — TEMPERATURE: 98 F | WEIGHT: 148 LBS | BODY MASS INDEX: 27.07 KG/M2

## 2025-01-02 DIAGNOSIS — D48.5 NEOPLASM OF UNCERTAIN BEHAVIOR OF SKIN: Primary | ICD-10-CM

## 2025-01-02 DIAGNOSIS — R21 RASH: ICD-10-CM

## 2025-01-02 PROCEDURE — 88341 IMHCHEM/IMCYTCHM EA ADD ANTB: CPT | Performed by: STUDENT IN AN ORGANIZED HEALTH CARE EDUCATION/TRAINING PROGRAM

## 2025-01-02 PROCEDURE — 88342 IMHCHEM/IMCYTCHM 1ST ANTB: CPT | Performed by: STUDENT IN AN ORGANIZED HEALTH CARE EDUCATION/TRAINING PROGRAM

## 2025-01-02 PROCEDURE — 88312 SPECIAL STAINS GROUP 1: CPT | Performed by: STUDENT IN AN ORGANIZED HEALTH CARE EDUCATION/TRAINING PROGRAM

## 2025-01-02 PROCEDURE — 99204 OFFICE O/P NEW MOD 45 MIN: CPT | Performed by: REGISTERED NURSE

## 2025-01-02 PROCEDURE — 88305 TISSUE EXAM BY PATHOLOGIST: CPT | Performed by: STUDENT IN AN ORGANIZED HEALTH CARE EDUCATION/TRAINING PROGRAM

## 2025-01-02 PROCEDURE — 11104 PUNCH BX SKIN SINGLE LESION: CPT | Performed by: REGISTERED NURSE

## 2025-01-02 RX ORDER — TRIAMCINOLONE ACETONIDE 1 MG/G
CREAM TOPICAL
Qty: 45 G | Refills: 2 | Status: SHIPPED | OUTPATIENT
Start: 2025-01-02

## 2025-01-02 NOTE — PROGRESS NOTES
"[facility] Dermatology Clinic Note     Patient Name: Bharti Li  Encounter Date: 1/2/25     Have you been cared for by a [facility] Dermatologist in the last 3 years and, if so, which description applies to you?    NO.   I am considered a \"new\" patient and must complete all patient intake questions. I am FEMALE/of child-bearing potential.    REVIEW OF SYSTEMS:  Have you recently had or currently have any of the following? Recent fever or chills? No  Any non-healing wound? No  Are you pregnant or planning to become pregnant? No  Are you currently or planning to be nursing or breast feeding? No   PAST MEDICAL HISTORY:  Have you personally ever had or currently have any of the following?  If \"YES,\" then please provide more detail. Skin cancer (such as Melanoma, Basal Cell Carcinoma, Squamous Cell Carcinoma?  No  Tuberculosis, HIV/AIDS, Hepatitis B or C: No  Radiation Treatment No   HISTORY OF IMMUNOSUPPRESSION:   Do you have a history of any of the following:  Systemic Immunosuppression such as Diabetes, Biologic or Immunotherapy, Chemotherapy, Organ Transplantation, Bone Marrow Transplantation or Prednsione?  YES, prednisone - 5 day course for the rash    Answering \"YES\" requires the addition of the dotphrase \"IMMUNOSUPPRESSED\" as the first diagnosis of the patient's visit.   FAMILY HISTORY:  Any \"first degree relatives\" (parent, brother, sister, or child) with the following?    Skin Cancer, Pancreatic or Other Cancer? YES, mother - cervical   PATIENT EXPERIENCE:    Do you want the Dermatologist to perform a COMPLETE skin exam today including a clinical examination under the \"bra and underwear\" areas?  NO  If necessary, do we have your permission to call and leave a detailed message on your Preferred Phone number that includes your specific medical information?  Yes      No Known Allergies   Current Outpatient Medications:     acetaminophen (TYLENOL) 325 mg tablet, Take 2 tablets (650 mg total) by mouth every 6 (six) " hours as needed for mild pain, headaches or fever (Patient not taking: Reported on 1/18/2024), Disp: 30 tablet, Rfl: 0        Whom besides the patient is providing clinical information about today's encounter?   NO ADDITIONAL HISTORIAN (patient alone provided history)    Physical Exam and Assessment/Plan by Diagnosis:    PAPULOSQUAMOUS RASH LIKELY GUTTATE PSORIASIS  Physical Exam:  Anatomic Location Affected:  trunk, extremities, face  Morphological Description:  erythematous scaly papules  Pertinent Positives:  Pertinent Negatives:    Additional History of Present Condition:  She notes that the rash first started last month. She said it felt like hives on her stomach, and she noticed scattered red spots and flakiness on the abdomen and back. She says that it has spread to her chest, head, neck. She says that it's not very itchy. She went to the ER and they had her on prednisone for 4 days for the rash, but it didn't help. She notes that she had a cold prior to the onset of the rash. No arthralgia.     Assessment and Plan:  Based on a thorough discussion of this condition and the management approach to it (including a comprehensive discussion of the known risks, side effects and potential benefits of treatment), the patient (family) agrees to implement the following specific plan:  Discussed that given history and clinical exams rash is likely psoriasis, however we considered other inflammatory conditions in the papulosquamous differential including NJ, PLC & Secondary Syphilis.   Apply triamcinolone 0.1% cream twice daily for 2 weeks, and then twice daily 3 times weekly (Mon, Wed, Fri) for maintenance   Punch biopsy done in office today.      NEOPLASM OF UNCERTAIN BEHAVIOR OF SKIN  Physical Exam:  (Anatomic Location); (Size and Morphological Description); (Differential Diagnosis):  A: lower mid back; erythematous scaly papules; Guttate psoriasis vs. NJ vs. PLC vs. Secondary Syphilis   Pertinent  "Positives:  Pertinent Negatives:      Additional History of Present Condition:  see above.    Assessment and Plan:  I have discussed with the patient that a sample of skin via a \"skin biopsy” would be potentially helpful to further make a specific diagnosis under the microscope.  Based on a thorough discussion of this condition and the management approach to it (including a comprehensive discussion of the known risks, side effects and potential benefits of treatment), the patient (family) agrees to implement the following specific plan:    Procedure:  Skin Biopsy.  After a thorough discussion of treatment options and risk/benefits/alternatives (including but not limited to local pain, scarring, dyspigmentation, blistering, possible superinfection, and inability to confirm a diagnosis via histopathology), verbal and written consent were obtained and portion of the rash was biopsied for tissue sample.  See below for consent that was obtained from patient and subsequent Procedure Note.     PROCEDURE NOTE:  PUNCH BIOPSY      Performing Physician:  Dr. Parker    Anatomic Location; Clinical Description with size (cm); Pre-Op Diagnosis:    A: lower mid back; erythematous scaly papules; Guttate psoriasis vs. HI vs. PLC vs. Secondary Syphilis        Anesthesia: 1% xylocaine with epi       Topical anesthesia: None       Indications: To indicate diagnosis and management plan.    Procedure Details     Patient informed of the risks (including bleeding,scaring and infection) and benefits of the procedure explained. Verbal and written informed consent obtained. The area was prepped and draped in the usual fashion. Anesthesia was obtained with 1% lidocaine with epinephrine. The skin was then stretched perpendicular to the skin tension lines and a punch biopsy to an appropriate sampling depth was obtained with a 4 mm punch with a forceps and iris scissors.     Hemostasis was obtained with 4-0 Prolene x 2 " sutures.     Complications:  None      Specimen has been sent for review by Dermatopathology.      Plan:  1. Instructed to keep the wound dry and covered for 24-48h and clean thereafter.  2. Warning signs of infection were reviewed.    3. Recommended that the patient use acetaminophen as needed for pain  4. Sutures if any should be removed in 10-14 days      Standard post-procedure care has been explained and has been included in written form within the patient's copy of Informed Consent.       Scribe Attestation      I,:  Charlotte Tejeda MA am acting as a scribe while in the presence of the attending physician.:       I,:  Blaise Parker MD personally performed the services described in this documentation    as scribed in my presence.:

## 2025-01-07 PROCEDURE — 88341 IMHCHEM/IMCYTCHM EA ADD ANTB: CPT | Performed by: STUDENT IN AN ORGANIZED HEALTH CARE EDUCATION/TRAINING PROGRAM

## 2025-01-07 PROCEDURE — 88312 SPECIAL STAINS GROUP 1: CPT | Performed by: STUDENT IN AN ORGANIZED HEALTH CARE EDUCATION/TRAINING PROGRAM

## 2025-01-07 PROCEDURE — 88305 TISSUE EXAM BY PATHOLOGIST: CPT | Performed by: STUDENT IN AN ORGANIZED HEALTH CARE EDUCATION/TRAINING PROGRAM

## 2025-01-07 PROCEDURE — 88342 IMHCHEM/IMCYTCHM 1ST ANTB: CPT | Performed by: STUDENT IN AN ORGANIZED HEALTH CARE EDUCATION/TRAINING PROGRAM

## 2025-01-08 ENCOUNTER — RESULTS FOLLOW-UP (OUTPATIENT)
Age: 40
End: 2025-01-08

## 2025-01-08 NOTE — RESULT ENCOUNTER NOTE
DERMATOPATHOLOGY RESULT NOTE    Results reviewed by ordering physician.  Called patient to personally discuss results. Discussed results with patient.       Instructions for Clinical Derm Team:   (remember to route Result Note to appropriate staff):    None    Result & Plan by Specimen:    Specimen A: Psoriasis   Plan:   - Continue with TMC 0.1% twice daily for the next 2 weeks and then taper to twice daily 3 times a week.   - Reach out to us if no improvement in 2-3 months time and we will discuss systemic treatment options.       A. Skin, lower mid back, punch biopsy:    Psoriasiform epidermal hyperplasia with numerous intraepidermal neutrophils (see note).    Note: Findings of trauma/irritation are seen. In the appropriate clinical context, the histopathologic findings are compatible with psoriasis. Clinical pathologic correlation is advised. Multiple levels examined. Pathogenic microorganisms are not seen with PAS stain. CD3, CD20, CD30, and  immunostains were reviewed; findings diagnostic of hematologic malignancy or a lymphoproliferative disorder are not appreciated. Significant lymphocytic cytologic atypia is not seen.  Spirochete immunostain is pending. If the rash were to progress/persist despite therapy, additional sampling should be sought to exclude development of a more significant disease.      Electronically signed by Delma Perales MD on 1/7/2025 at 1825 EST

## 2025-01-13 ENCOUNTER — CLINICAL SUPPORT (OUTPATIENT)
Dept: DERMATOLOGY | Facility: CLINIC | Age: 40
End: 2025-01-13

## 2025-01-13 DIAGNOSIS — Z48.02 ENCOUNTER FOR REMOVAL OF SUTURES: Primary | ICD-10-CM

## 2025-01-13 PROCEDURE — RECHECK: Performed by: DERMATOLOGY

## 2025-01-13 NOTE — PROGRESS NOTES
"Suture removal    Date/Time: 1/13/2025 9:30 AM    Performed by: Nena Lee RN  Authorized by: Jorge Washburn MD  Universal Protocol:  procedure performed by consultantConsent: Verbal consent obtained. Written consent not obtained.  Risks and benefits: risks, benefits and alternatives were discussed  Consent given by: patient  Time out: Immediately prior to procedure a \"time out\" was called to verify the correct patient, procedure, equipment, support staff and site/side marked as required.  Timeout called at: 1/13/2025 9:20 AM.  Patient understanding: patient states understanding of the procedure being performed  Patient consent: the patient's understanding of the procedure matches consent given  Procedure consent: procedure consent matches procedure scheduled  Relevant documents: relevant documents not present or verified  Test results: test results not available  Site marked: the operative site was not marked  Radiology Images displayed and confirmed. If images not available, report reviewed: imaging studies not available  Patient identity confirmed: verbally with patient      Patient location:  Clinic  Location:     Location:  Trunk    Trunk location:  Back (lower mid)  Procedure details:     Tools used:  Suture removal kit    Wound appearance:  No sign(s) of infection, good wound healing, clean, moist and pink    Number of sutures removed:  2  Post-procedure details:     Post-removal:  Band-Aid applied (Vaseline applied)    Patient tolerance of procedure:  Tolerated well, no immediate complications      Before suture removal     After suture removal   "

## 2025-01-27 ENCOUNTER — TELEPHONE (OUTPATIENT)
Age: 40
End: 2025-01-27

## 2025-01-27 NOTE — TELEPHONE ENCOUNTER
Patient called because her rash is not improving and triamcinolone is not helping. Patient doesn't want to wait 3 months to see if it will get better.     I scheduled her a follow up again 1/29/2025.    Can she apply anything else in the meantime ? Last seen 1/02/2025. I did advise to continue using triamcinolone until seen in office just in case.

## 2025-01-29 ENCOUNTER — APPOINTMENT (OUTPATIENT)
Dept: LAB | Facility: CLINIC | Age: 40
End: 2025-01-29
Payer: COMMERCIAL

## 2025-01-29 ENCOUNTER — OFFICE VISIT (OUTPATIENT)
Age: 40
End: 2025-01-29
Payer: COMMERCIAL

## 2025-01-29 VITALS — BODY MASS INDEX: 27.16 KG/M2 | TEMPERATURE: 98.3 F | WEIGHT: 148.5 LBS

## 2025-01-29 DIAGNOSIS — Z11.3 SCREEN FOR STD (SEXUALLY TRANSMITTED DISEASE): ICD-10-CM

## 2025-01-29 DIAGNOSIS — L40.9 PSORIASIS: ICD-10-CM

## 2025-01-29 DIAGNOSIS — L40.4 GUTTATE PSORIASIS: Primary | ICD-10-CM

## 2025-01-29 DIAGNOSIS — L40.4 GUTTATE PSORIASIS: ICD-10-CM

## 2025-01-29 LAB
BASOPHILS # BLD AUTO: 0.07 THOUSANDS/ΜL (ref 0–0.1)
BASOPHILS NFR BLD AUTO: 1 % (ref 0–1)
EOSINOPHIL # BLD AUTO: 0.1 THOUSAND/ΜL (ref 0–0.61)
EOSINOPHIL NFR BLD AUTO: 2 % (ref 0–6)
ERYTHROCYTE [DISTWIDTH] IN BLOOD BY AUTOMATED COUNT: 12.6 % (ref 11.6–15.1)
HCT VFR BLD AUTO: 43.9 % (ref 34.8–46.1)
HGB BLD-MCNC: 14.8 G/DL (ref 11.5–15.4)
IMM GRANULOCYTES # BLD AUTO: 0.02 THOUSAND/UL (ref 0–0.2)
IMM GRANULOCYTES NFR BLD AUTO: 0 % (ref 0–2)
LYMPHOCYTES # BLD AUTO: 2.48 THOUSANDS/ΜL (ref 0.6–4.47)
LYMPHOCYTES NFR BLD AUTO: 38 % (ref 14–44)
MCH RBC QN AUTO: 30.4 PG (ref 26.8–34.3)
MCHC RBC AUTO-ENTMCNC: 33.7 G/DL (ref 31.4–37.4)
MCV RBC AUTO: 90 FL (ref 82–98)
MONOCYTES # BLD AUTO: 0.53 THOUSAND/ΜL (ref 0.17–1.22)
MONOCYTES NFR BLD AUTO: 8 % (ref 4–12)
NEUTROPHILS # BLD AUTO: 3.4 THOUSANDS/ΜL (ref 1.85–7.62)
NEUTS SEG NFR BLD AUTO: 51 % (ref 43–75)
NRBC BLD AUTO-RTO: 0 /100 WBCS
PLATELET # BLD AUTO: 406 THOUSANDS/UL (ref 149–390)
PMV BLD AUTO: 10.3 FL (ref 8.9–12.7)
RBC # BLD AUTO: 4.87 MILLION/UL (ref 3.81–5.12)
WBC # BLD AUTO: 6.6 THOUSAND/UL (ref 4.31–10.16)

## 2025-01-29 PROCEDURE — 87389 HIV-1 AG W/HIV-1&-2 AB AG IA: CPT

## 2025-01-29 PROCEDURE — 85025 COMPLETE CBC W/AUTO DIFF WBC: CPT

## 2025-01-29 PROCEDURE — 87521 HEPATITIS C PROBE&RVRS TRNSC: CPT

## 2025-01-29 PROCEDURE — 87522 HEPATITIS C REVRS TRNSCRPJ: CPT

## 2025-01-29 PROCEDURE — 87340 HEPATITIS B SURFACE AG IA: CPT

## 2025-01-29 PROCEDURE — 99214 OFFICE O/P EST MOD 30 MIN: CPT | Performed by: REGISTERED NURSE

## 2025-01-29 PROCEDURE — 86780 TREPONEMA PALLIDUM: CPT

## 2025-01-29 PROCEDURE — 80053 COMPREHEN METABOLIC PANEL: CPT

## 2025-01-29 PROCEDURE — 36415 COLL VENOUS BLD VENIPUNCTURE: CPT

## 2025-01-29 RX ORDER — CLOBETASOL PROPIONATE 0.5 MG/G
OINTMENT TOPICAL 2 TIMES DAILY
Qty: 120 G | Refills: 5 | Status: SHIPPED | OUTPATIENT
Start: 2025-01-29

## 2025-01-29 RX ORDER — APREMILAST 30 MG/1
TABLET, FILM COATED ORAL
Status: CANCELLED | OUTPATIENT
Start: 2025-01-29

## 2025-01-29 RX ORDER — CLOBETASOL PROPIONATE 0.5 MG/G
CREAM TOPICAL 2 TIMES DAILY
Qty: 120 G | Refills: 4 | Status: SHIPPED | OUTPATIENT
Start: 2025-01-29 | End: 2025-01-29 | Stop reason: CLARIF

## 2025-01-29 NOTE — PATIENT INSTRUCTIONS
Assessment and Plan:  Based on a thorough discussion of this condition and the management approach to it (including a comprehensive discussion of the known risks, side effects and potential benefits of treatment), the patient (family) agrees to implement the following specific plan:  Discussed Otezla oral medication symptoms and side effects  Get labs CBC and CMP   Also discussed about Taltz injection, and getting prior authorization   Apply Clobeatsol 0.05% cream twice a day for two weeks, after switch to three times a weeks

## 2025-01-29 NOTE — PROGRESS NOTES
"[facility] Dermatology Clinic Note     Patient Name: Bharti Li  Encounter Date: 1/29/25     Have you been cared for by a [facility] Dermatologist in the last 3 years and, if so, which description applies to you?    Yes.  I have been here within the last 3 years, and my medical history has NOT changed since that time.  I am FEMALE/of child-bearing potential.    REVIEW OF SYSTEMS:  Have you recently had or currently have any of the following? No changes in my recent health.   PAST MEDICAL HISTORY:  Have you personally ever had or currently have any of the following?  If \"YES,\" then please provide more detail. No changes in my medical history.   HISTORY OF IMMUNOSUPPRESSION: Do you have a history of any of the following:  Systemic Immunosuppression such as Diabetes, Biologic or Immunotherapy, Chemotherapy, Organ Transplantation, Bone Marrow Transplantation or Prednisone?  No     Answering \"YES\" requires the addition of the dotphrase \"IMMUNOSUPPRESSED\" as the first diagnosis of the patient's visit.   FAMILY HISTORY:  Any \"first degree relatives\" (parent, brother, sister, or child) with the following?    No changes in my family's known health.   PATIENT EXPERIENCE:    Do you want the Dermatologist to perform a COMPLETE skin exam today including a clinical examination under the \"bra and underwear\" areas?  NO  If necessary, do we have your permission to call and leave a detailed message on your Preferred Phone number that includes your specific medical information?  Yes      No Known Allergies   Current Outpatient Medications:     acetaminophen (TYLENOL) 325 mg tablet, Take 2 tablets (650 mg total) by mouth every 6 (six) hours as needed for mild pain, headaches or fever (Patient not taking: Reported on 1/2/2025), Disp: 30 tablet, Rfl: 0    triamcinolone (KENALOG) 0.1 % cream, Apply BID for up to 2 weeks to affected skin on neck down prn flares then take one week break and can repeat regimen prn flares. Do not apply to " groin, skin folds, face., Disp: 45 g, Rfl: 2          Whom besides the patient is providing clinical information about today's encounter?   NO ADDITIONAL HISTORIAN (patient alone provided history)    Physical Exam and Assessment/Plan by Diagnosis:    GUTTATE PSORIASIS    Physical Exam:  Anatomic Location Affected:  face, trunk and extremities  Morphological Description:  erythematous scaly papules & plaques  Severity: moderate  Body Percent Affected: 10%  Pertinent Positives:  Pertinent Negatives:    Additional History of Present Condition:  Patient seen 3 weeks ago for a rash that had been present for about a month. Biopsy was consistent with psoriasis and was prescribed topical steroids. Rash has significantly worsened to involve more areas of the trunk, extremities and scalp despite using the topical steroid as prescribed.  Rash is pruritic and applying topical steroids has not been helpful. Denies any joint pain. No history of depression and or suicidal ideations.     Assessment and Plan:  Based on a thorough discussion of this condition and the management approach to it (including a comprehensive discussion of the known risks, side effects and potential benefits of treatment), the patient (family) agrees to implement the following specific plan:  Discussed Otezla and IL-17 inhibitors and she preferred the former.   Abdominal pain, decreased appetite, dyspepsia, frequent bowel movements, vomiting, Diarrhea, nausea, weight loss, Upper respiratory tract infection, HA, Depressed mood, depression, fatigue, suicidal ideation.   Day 1: Oral: 10 mg once daily in the morning, Day 2: Oral: 10 mg twice daily, Day 3: Oral: 10 mg in the morning and 20 mg in the evening, Day 4: Oral: 20 mg twice daily, Day 5: Oral: 20 mg in the morning and 30 mg in the evening, Day 6 and thereafter: Oral: 30 mg twice daily.   No current CBC & CMP and so ordered that to be done    Prescribed Clobeatsol 0.05% cream twice a day for two weeks,  after switch to three times a weeks     Psoriasis is a chronic inflammatory condition that causes the body to make new skin cells in days rather than weeks. As these cells pile up on the surface of the skin, you may see thick, scaly patches of thickened skin.   Psoriasis affects 2-4% of males and females. It can start at any age including childhood, with peaks of onset at 15-25 years and 50-60 years. It tends to persist lifelong, fluctuating in extent and severity. It is particularly common in Caucasians but may affect people of any race. About one-third of patients with psoriasis have family members with psoriasis.  Psoriasis is multifactorial. It is classified as an immune-mediated inflammatory disease (IMID). Genetic factors are important and influence the type of psoriasis and response to treatment.     What are the signs and symptoms of psoriasis?    There are many different types of psoriasis that each have present uniquely. The types of psoriasis include:    Plaque psoriasis: About 80% to 90% of people who have psoriasis develop this type. When plaque psoriasis appears, you may see:  Plaque psoriasis usually presents with symmetrically distributed, red, scaly plaques with well-defined edges. The scale is typically silvery white, except in skin folds where the plaques often appear shiny and they may have a moist peeling surface. The most common sites are scalp, elbows and knees, but any part of the skin can be involved. The plaques are usually very persistent without treatment.  Itch is mostly mild but may be severe in some patients, leading to scratching and lichenification (thickened leathery skin with increased skin markings). Painful skin cracks or fissures may occur.  When psoriatic plaques clear up, they may leave brown or pale marks that can be expected to fade over several months.    Guttate psoriasis: When someone gets this type of psoriasis, you often see tiny bumps appear on the skin quite suddenly.  The bumps tend to cover much of the torso, legs, and arms. Sometimes, the bumps also develop on the face, scalp, and ears. No matter where they appear, the bumps tend to be:   Small and scaly  [place]-colored to pink  Temporary, clearing in a few weeks or months without treatment  When guttate psoriasis clears, it may never return. Why this happens is still a bit of a mystery. Guttate psoriasis tends to develop in children and young adults who've had an infection, such as strep throat. It's possible that when the infection clears so does guttate psoriasis.  It's also possible to have:  Guttate psoriasis for life  See the guttate psoriasis clear and plaque psoriasis develop later in life  Plaque psoriasis when you develop guttate psoriasis  There's no way to predict what will happen after the first flare-up of guttate psoriasis clears.    Inverse psoriasis: This type of psoriasis develops in areas where skin touches skin, such as the armpits, genitals, and crease of the buttocks. Where the inverse psoriasis appears, you're likely to notice:  Smooth, red patches of skin that look raw  Little, if any, silvery-white coating  Sore or painful skin  Other names for this type of psoriasis are intertriginous psoriasis or flexural psoriasis.  Pustular psoriasis: This type of psoriasis causes pus-filled bumps that usually appear only on the feet and hands. While the pus-filled bumps may look like an infection, the skin is not infected. The bumps don't contain bacteria or anything else that could cause an infection.  Where pustular psoriasis appears, you tend to notice:  Red, swollen skin that is dotted with pus-filled bumps  Extremely sore or painful skin  Brown dots (and sometimes scale) appear as the pus-filled bumps dry  Pustular psoriasis can make just about any activity that requires your hands or feet, such as typing or walking, unbearably painful.    Pustular psoriasis (generalized): Serious and life-threatening, this  rare type of psoriasis causes pus-filled bumps to develop on much of the skin. Also called von Zumbusch psoriasis, a flare-up causes this sequence of events:  Skin on most of the body suddenly turns dry, red, and tender.  Within hours, pus-filled bumps cover most of the skin.  Often within a day, the pus-filled bumps break open and pools of pus leak onto the skin.  As the pus dries (usually within 24 to 48 hours), the skin dries out and peels (as shown in this picture).  When the dried skin peels off, you see a smooth, glazed surface.  In a few days or weeks, you may see a new crop of pus-filled bumps covering most of the skin, as the cycle repeats itself.  Anyone with pustular psoriasis also feels very sick, and may develop a fever, headache, muscle weakness, and other symptoms. Medical care is often necessary to save the person's life.    Erythrodermic psoriasis: Serious and life-threatening, this type of psoriasis requires immediate medical care. When someone develops erythrodermic psoriasis, you may notice:  Skin on most of the body looks burnt  Chills, fever, and the person looks extremely ill  Muscle weakness, a rapid pulse, and severe itch  The person may also be unable to keep warm, so hypothermia can set in quickly.  Most people who develop this type of psoriasis already have another type of psoriasis. Before developing erythrodermic psoriasis, they often notice that their psoriasis is worsening or not improving with treatment. If you notice either of these happening, see a board-certified dermatologist.    Nails    Nail psoriasis: With any type of psoriasis, you may see changes to your fingernails or toenails. About half of the people who have plaque psoriasis see signs of psoriasis on their fingernails at some point2.  When psoriasis affects the nails, you may notice:  Tiny dents in your nails (called “nail pits”)  White, yellow, or brown discoloration under one or more nails  Crumbling, rough nails  A  nail lifting up so that it's no longer attached  Buildup of skin cells beneath one or more nails, which lifts up the nail  Treatment and proper nail care can help you control nail psoriasis.    Psoriatic arthritis: If you have psoriasis, it's important to pay attention to your joints. Some people who have psoriasis develop a type of arthritis called psoriatic arthritis. This is more likely to occur if you have severe psoriasis.  Most people notice psoriasis on their skin years before they develop psoriatic arthritis. It's also possible to get psoriatic arthritis before psoriasis, but this is less common.  When psoriatic arthritis develops, the signs can be subtle. At first, you may notice:  A swollen and tender joint, especially in a finger or toe  Heel pain  Swelling on the back of your leg, just above your heel  Stiffness in the morning that fades during the day  Like psoriasis, psoriatic arthritis cannot be cured. Treatment can prevent psoriatic arthritis from worsening, which is important. Allowed to progress, psoriatic arthritis can become disabling.    Diagnosis and treatment of psoriasis   Psoriasis is usually diagnosed by clinical features, and skin biopsy if necessary.   It is important to decrease factors that aggravate psoriasis. These include treating streptococcal infections, minimizing skin injuries, avoiding sun exposure if it exacerbates psoriasis, smoking, alcohol usage, decreasing stress, and maintaining an optimal body weight. Certain medications such as lithium, beta blockers, antimalarials, and NSAIDs have also been implicated. Suddenly stopping oral steroids or strong topical steroids can cause rebound disease.     There are many categories of psoriasis treatments available.     Topical therapy  Mild psoriasis is generally treated with topical agents alone. Which treatment is selected may depend on body site, extent and severity of psoriasis.  Emollients  Coal tar  preparations  Dithranol  Salicylic acid  Vitamin D analogue (calcipotriol)  Topical corticosteroids  Calcineurin inhibitor (tacrolimus, pimecrolimus)  Phototherapy  Most psoriasis centres offer phototherapy with ultraviolet (UV) radiation, often in combination with topical or systemic agents. Types of phototherapy include  Narrowband UVB  Broadband UVB  Photochemotherapy (PUVA)  Targeted phototherapy  Systemic therapy  Moderate to severe psoriasis warrants treatment with a systemic agent and/or phototherapy. The most common treatments are:  Methotrexate  Ciclosporin  Acitretin  Other medicines occasionally used for psoriasis include:  Mycophenolate  Apremilast  Hydroxyurea  Azathioprine  6-mercaptopurine  Systemic corticosteroids are best avoided due to a risk of severe withdrawal flare of psoriasis and adverse effects.  Biologics or targeted therapies are reserved for conventional treatment-resistant severe psoriasis, mainly because of expense, as side effects compare favorably with other systemic agents. These include:  Anti-tumour necrosis factor-alpha antagonists (anti-TNF?) infliximab, adalimumab and etanercept  The interleukin (IL)-12/23 antagonist ustekinumab  IL-17 antagonists such as secukinumab  Many other monoclonal antibodies are under investigation in the treatment of psoriasis.   Scribe Attestation      I,:  Zulema Chavez MA am acting as a scribe while in the presence of the attending physician.:       I,:  Blaise Parker MD personally performed the services described in this documentation    as scribed in my presence.:

## 2025-01-30 ENCOUNTER — TELEPHONE (OUTPATIENT)
Age: 40
End: 2025-01-30

## 2025-01-30 LAB
ALBUMIN SERPL BCG-MCNC: 4.7 G/DL (ref 3.5–5)
ALP SERPL-CCNC: 67 U/L (ref 34–104)
ALT SERPL W P-5'-P-CCNC: 15 U/L (ref 7–52)
ANION GAP SERPL CALCULATED.3IONS-SCNC: 13 MMOL/L (ref 4–13)
AST SERPL W P-5'-P-CCNC: 15 U/L (ref 13–39)
BILIRUB SERPL-MCNC: 1.39 MG/DL (ref 0.2–1)
BUN SERPL-MCNC: 13 MG/DL (ref 5–25)
CALCIUM SERPL-MCNC: 9.7 MG/DL (ref 8.4–10.2)
CHLORIDE SERPL-SCNC: 100 MMOL/L (ref 96–108)
CO2 SERPL-SCNC: 25 MMOL/L (ref 21–32)
CREAT SERPL-MCNC: 0.56 MG/DL (ref 0.6–1.3)
GFR SERPL CREATININE-BSD FRML MDRD: 116 ML/MIN/1.73SQ M
GLUCOSE P FAST SERPL-MCNC: 77 MG/DL (ref 65–99)
HBV SURFACE AG SER QL: NORMAL
HIV 1+2 AB+HIV1 P24 AG SERPL QL IA: NORMAL
HIV 2 AB SERPL QL IA: NORMAL
HIV1 AB SERPL QL IA: NORMAL
HIV1 P24 AG SERPL QL IA: NORMAL
POTASSIUM SERPL-SCNC: 3.7 MMOL/L (ref 3.5–5.3)
PROT SERPL-MCNC: 7.7 G/DL (ref 6.4–8.4)
SODIUM SERPL-SCNC: 138 MMOL/L (ref 135–147)
TREPONEMA PALLIDUM IGG+IGM AB [PRESENCE] IN SERUM OR PLASMA BY IMMUNOASSAY: NORMAL

## 2025-01-30 NOTE — TELEPHONE ENCOUNTER
PA for Otezla 10, 20, 30mg tablets SUBMITTED to [facility]     via    []CMM-KEY:   [x]Starter pack Surescripts-Case ID #[id number]   [x]Maintenance dosing Surescripts-Case ID # [id number]   []Availity-Auth ID # NDC #   []Faxed to plan   []Other website   []Phone call Case ID #     [x]PA sent as URGENT    All office notes, labs and other pertaining documents and studies sent. Clinical questions answered. Awaiting determination from insurance company.     Turnaround time for your insurance to make a decision on your Prior Authorization can take 7-21 business days.

## 2025-01-31 ENCOUNTER — TELEPHONE (OUTPATIENT)
Age: 40
End: 2025-01-31

## 2025-01-31 DIAGNOSIS — L40.9 PSORIASIS: ICD-10-CM

## 2025-01-31 LAB — HCV RNA SERPL NAA+PROBE-ACNC: NOT DETECTED K[IU]/ML

## 2025-01-31 NOTE — TELEPHONE ENCOUNTER
Rep from [facility] specialty pharmacy calling to relay dispense qty on otezla starter pack rx is incorrect and they need new rx to be sent in order to dispense.

## 2025-01-31 NOTE — TELEPHONE ENCOUNTER
PA for Otezla  DENIED    Reason:(Screenshot if applicable)- Due to not having lab results for TB and Hep B as well as no screening done for mental health issues.         Message sent to office clinical pool Yes    Denial letter scanned into Media No Spoke to representative Jaelyn MCKEON, direct fax number was given for official denial     Appeal started No (Provider will need to decide if appeal is warranted and send clinical documentation to Prior Authorization Team for initiation.)    **Please follow up with your patient regarding denial and next steps**

## 2025-02-03 ENCOUNTER — RESULTS FOLLOW-UP (OUTPATIENT)
Dept: OBGYN CLINIC | Facility: CLINIC | Age: 40
End: 2025-02-03

## 2025-02-03 NOTE — TELEPHONE ENCOUNTER
----- Message from DOROTEO Do sent at 1/31/2025  2:30 PM EST -----  Please let her know the sti blood work is negative. Thank you!    Informed pt of her neg sti blood work.Pt verbalized understanding.

## 2025-02-04 NOTE — TELEPHONE ENCOUNTER
Rec'd call from Samara at [facility]. She states that she's been informed of denial and was clarifying that prescribing physicians office has received denial and what questions needed to be answered in order to obtain PA for Otezla.    All is scanned under Media.

## 2025-02-05 NOTE — TELEPHONE ENCOUNTER
Call received from [facility] pharmacy asking if Dr. Parker would like to appeal .   Please advise .

## 2025-02-13 NOTE — TELEPHONE ENCOUNTER
Call received from Marixa NGUYỄN from [facility] specialty pharmacy PA NUMBER : [id number] was approved from 02/05/2025-03/05/2026  started pack ndc [id number] , Pa NUMBER : [id number] maintenance NDC  [id number] approved from 03/05/2025-02/05/2026.   The maintenance dosage can not be dispense until 03/05/2025.

## 2025-02-14 ENCOUNTER — TELEPHONE (OUTPATIENT)
Age: 40
End: 2025-02-14

## 2025-02-14 NOTE — TELEPHONE ENCOUNTER
[facility] is requesting a script for the Apremilast. Pharmacy is unsure what dose that the patient needs. Please check with patient to see if they need the starter pack sent to [facility] or just the 30mg.

## 2025-02-17 NOTE — TELEPHONE ENCOUNTER
Ph called to [facility] pharmacy [phone] and called in starter pack and maintenance for ampremilast tablet

## 2025-02-20 NOTE — TELEPHONE ENCOUNTER
Recieved call from [facility]. They have not received the have not received the script for the loading dose for the Otezla.       Derm Clinical [place]: Please call [facility] [phone] regarding loading dose for Otezla script.

## 2025-02-21 NOTE — TELEPHONE ENCOUNTER
[facility] called to check on the status for the otezla advised them that we sent it to [facility]

## 2025-07-22 ENCOUNTER — TELEPHONE (OUTPATIENT)
Dept: INTERNAL MEDICINE CLINIC | Facility: CLINIC | Age: 40
End: 2025-07-22

## 2025-07-22 ENCOUNTER — APPOINTMENT (OUTPATIENT)
Dept: RADIOLOGY | Age: 40
End: 2025-07-22
Payer: COMMERCIAL

## 2025-07-22 ENCOUNTER — OFFICE VISIT (OUTPATIENT)
Dept: INTERNAL MEDICINE CLINIC | Facility: CLINIC | Age: 40
End: 2025-07-22

## 2025-07-22 VITALS
WEIGHT: 150 LBS | SYSTOLIC BLOOD PRESSURE: 120 MMHG | BODY MASS INDEX: 27.6 KG/M2 | DIASTOLIC BLOOD PRESSURE: 77 MMHG | RESPIRATION RATE: 16 BRPM | HEIGHT: 62 IN | HEART RATE: 68 BPM | OXYGEN SATURATION: 99 %

## 2025-07-22 DIAGNOSIS — G44.209 TENSION HEADACHE: ICD-10-CM

## 2025-07-22 DIAGNOSIS — Z00.00 ANNUAL PHYSICAL EXAM: Primary | ICD-10-CM

## 2025-07-22 DIAGNOSIS — M94.0 COSTOCHONDRITIS: ICD-10-CM

## 2025-07-22 DIAGNOSIS — L40.9 PSORIASIS: ICD-10-CM

## 2025-07-22 DIAGNOSIS — Z13.1 SCREENING FOR DIABETES MELLITUS: ICD-10-CM

## 2025-07-22 DIAGNOSIS — Z76.89 ENCOUNTER TO ESTABLISH CARE: ICD-10-CM

## 2025-07-22 PROCEDURE — 99386 PREV VISIT NEW AGE 40-64: CPT | Performed by: STUDENT IN AN ORGANIZED HEALTH CARE EDUCATION/TRAINING PROGRAM

## 2025-07-22 PROCEDURE — 99214 OFFICE O/P EST MOD 30 MIN: CPT | Performed by: STUDENT IN AN ORGANIZED HEALTH CARE EDUCATION/TRAINING PROGRAM

## 2025-07-22 PROCEDURE — 71046 X-RAY EXAM CHEST 2 VIEWS: CPT

## 2025-07-22 RX ORDER — SENNOSIDES 8.6 MG
650 CAPSULE ORAL EVERY 8 HOURS PRN
Qty: 42 TABLET | Refills: 0 | Status: SHIPPED | OUTPATIENT
Start: 2025-07-22 | End: 2025-08-05

## 2025-07-22 RX ORDER — SELENIUM SULFIDE 2.5 MG/100ML
LOTION TOPICAL DAILY PRN
Qty: 118 ML | Refills: 2 | Status: SHIPPED | OUTPATIENT
Start: 2025-07-22 | End: 2025-10-20

## 2025-07-22 NOTE — TELEPHONE ENCOUNTER
At checkout pt stated dr said he was going to place orders for x-rays and labs.    Pt would like a call once orders are placed

## 2025-07-22 NOTE — PROGRESS NOTES
Adult Annual Physical  Name: Bharti Li      : 1985      MRN: [id number]  Encounter Provider: Veronica Dye MD  Encounter Date: 2025   Encounter department: [facility] [place]    :  Assessment & Plan  Annual physical exam  Bharti Li, a 40/F, presents to establish primary care. She has history significant for plaque psoriasis and hysterectomy in  for AUB and ALVERTO III on pap smear. She follows with Dermatology and Obs-Gyn.  She complains of bony swelling in Rt chest area. It is mildly tender and patient does not know about the onset of the swelling but recently noticed the swelling and wanted to get evaluated.   She is due for an annual physical exam.         Encounter to establish care  Bharti Li, natalie 40/F, presents to establish primary care. She has history significant for plaque psoriasis and hysterectomy in  for AUB and ALVERTO III on pap smear. She follows with Dermatology and Obs-Gyn.  She complains of bony swelling in Rt chest area. It is mildly tender and patient does not know about the onset of the swelling but recently noticed the swelling and wanted to get evaluated.   Orders:    Lipid panel; Future    Comprehensive metabolic panel; Future    TSH, 3rd generation; Future    Vitamin D 25 hydroxy; Future    Mammo breast specimen bilateral (no charge); Future    Tension headache  Patient presents with infrequent headache. Headache is like a tight band like sensation around her head, she also complains of neck muscle strain in her neck. The headaches are not associated with nausea, vomiting, photophobia or phonophobia, visual disturbance, loss of consciousness and early morning symptoms. The headaches have not change in frequency, location or nature.  The patient recently underwent break-up, which could be a precipitating factor for TTH as headaches started around the time of breakup.    Plan:  -Tab tylenol 650 mg 6 hourly PRN for 2 weeks   -Stress management  -Further  imaging if no relief or change in nature of headaches   Orders:    acetaminophen (TYLENOL) 650 mg CR tablet; Take 1 tablet (650 mg total) by mouth every 8 (eight) hours as needed for headaches or mild pain for up to 14 days    BMI 27.0-27.9,adult  Body mass index is 27.44 kg/m². The BMI is above normal.   Nutrition recommendations and exercise recommendations given to the patient.   Costochondritis  Mild costochondritis with mild tenderness over on the Right chest area. There is associated swelling in the area.     Orders:    XR chest pa and lateral; Future    acetaminophen (TYLENOL) 650 mg CR tablet; Take 1 tablet (650 mg total) by mouth every 8 (eight) hours as needed for headaches or mild pain for up to 14 days    Psoriasis  Erythematous scaly papules & plaques over various parts of the body. Patient following up with [facility] and has improvement in symptoms. Patient complains of scaling in her scalp with itching.    Plan-  - Selenium sulphide shampoo 2.5%   Orders:    selenium sulfide (SELSUN) 2.5 % shampoo; Apply topically daily as needed for dandruff    Screening for diabetes mellitus    Orders:    Glucose, fasting; Future    Hemoglobin A1C; Future        Preventive Screenings:  - Diabetes Screening: screening up-to-date  - Cholesterol Screening: orders placed   - Hepatitis C screening: screening up-to-date   - HIV screening: screening up-to-date   - Cervical cancer screening: has history of cervical cancer and screening not indicated   - Breast cancer screening: screening up-to-date   - Lung cancer screening: screening not indicated     BMI Counseling: Body mass index is 27.44 kg/m². The BMI is above normal. Nutrition recommendations include decreasing portion sizes, encouraging healthy choices of fruits and vegetables, decreasing fast food intake, consuming healthier snacks, limiting drinks that contain sugar, moderation in carbohydrate intake, increasing intake of lean protein and reducing  "intake of cholesterol. Exercise recommendations include exercising 3-5 times per week. No pharmacotherapy was ordered. Rationale for BMI follow-up plan is due to patient being overweight or obese.     Depression Screening and Follow-up Plan: Patient was screened for depression during today's encounter. They screened negative with a PHQ-2 score of 0.        History of Present Illness     Adult Annual Physical:  Patient presents for annual physical.     Diet and Physical Activity:  - Diet/Nutrition: well balanced diet and adequate fiber intake.  - Exercise: walking.    Depression Screening:  - PHQ-2 Score: 0    General Health:  - Sleep: sleeps well.  - Hearing: normal hearing right ear and normal hearing left ear.  - Vision: wears glasses and contacts.  - Dental: regular dental visits.    /GYN Health:  - Follows with GYN: yes.   - Menopause: postmenopausal.   - History of STDs: no  - Contraception:. S/P Hysterectomy      Advanced Care Planning:  - Has an advanced directive?: no    - Has a durable medical POA?: no    - ACP document given to patient?: no      Review of Systems   Constitutional:  Negative for activity change, appetite change, fatigue, fever and unexpected weight change.   HENT:  Negative for congestion.    Eyes:  Negative for pain, discharge and redness.   Respiratory:  Negative for cough and chest tightness.    Cardiovascular:  Negative for chest pain and palpitations.   Gastrointestinal:  Negative for nausea and vomiting.   Genitourinary:  Negative for dysuria.   Musculoskeletal:  Positive for neck pain. Negative for arthralgias.   Skin:  Negative for color change.   Neurological:  Positive for headaches. Negative for weakness and light-headedness.         Objective   /77 (BP Location: Left arm, Patient Position: Sitting, Cuff Size: Adult)   Pulse 68   Resp 16   Ht 5' 2\" (1.575 m)   Wt 68 kg (150 lb)   LMP 11/19/2022 (Approximate)   SpO2 99%   BMI 27.44 kg/m²     Physical " Exam  Constitutional:       Appearance: Normal appearance. She is obese.   HENT:      Head: Normocephalic and atraumatic.      Ears:      Comments: External ears have psoriatic plaques     Nose: Nose normal.      Mouth/Throat:      Mouth: Mucous membranes are moist.     Eyes:      Pupils: Pupils are equal, round, and reactive to light.       Cardiovascular:      Rate and Rhythm: Normal rate and regular rhythm.      Pulses: Normal pulses.      Heart sounds: Normal heart sounds.   Pulmonary:      Effort: Pulmonary effort is normal.      Breath sounds: Normal breath sounds.   Abdominal:      Palpations: Abdomen is soft.     Musculoskeletal:         General: No swelling or tenderness. Normal range of motion.      Right lower leg: No edema.      Left lower leg: No edema.     Skin:     General: Skin is warm.      Capillary Refill: Capillary refill takes less than 2 seconds.     Neurological:      Mental Status: She is alert and oriented to person, place, and time.     Psychiatric:         Mood and Affect: Mood normal.         Behavior: Behavior normal.

## 2025-07-22 NOTE — ASSESSMENT & PLAN NOTE
Erythematous scaly papules & plaques over various parts of the body. Patient following up with [facility] and has improvement in symptoms. Patient complains of scaling in her scalp with itching.    Plan-  - Selenium sulphide shampoo 2.5%   Orders:    selenium sulfide (SELSUN) 2.5 % shampoo; Apply topically daily as needed for dandruff

## 2025-07-22 NOTE — ASSESSMENT & PLAN NOTE
Patient presents with infrequent headache. Headache is like a tight band like sensation around her head, she also complains of neck muscle strain in her neck. The headaches are not associated with nausea, vomiting, photophobia or phonophobia, visual disturbance, loss of consciousness and early morning symptoms. The headaches have not change in frequency, location or nature.  The patient recently underwent break-up, which could be a precipitating factor for TTH as headaches started around the time of breakup.    Plan:  -Tab tylenol 650 mg 6 hourly PRN for 2 weeks   -Stress management  -Further imaging if no relief or change in nature of headaches   Orders:    acetaminophen (TYLENOL) 650 mg CR tablet; Take 1 tablet (650 mg total) by mouth every 8 (eight) hours as needed for headaches or mild pain for up to 14 days

## 2025-07-22 NOTE — ASSESSMENT & PLAN NOTE
Mild costochondritis with mild tenderness over on the Right chest area. There is associated swelling in the area.     Orders:    XR chest pa and lateral; Future    acetaminophen (TYLENOL) 650 mg CR tablet; Take 1 tablet (650 mg total) by mouth every 8 (eight) hours as needed for headaches or mild pain for up to 14 days

## 2025-07-22 NOTE — ASSESSMENT & PLAN NOTE
Body mass index is 27.44 kg/m². The BMI is above normal.   Nutrition recommendations and exercise recommendations given to the patient.

## 2025-07-22 NOTE — PATIENT INSTRUCTIONS
"Follow up with me 3 months later.  Patient Education     Routine physical for adults   The Basics   Written by the doctors and editors at [facility]   What is a physical? -- A physical is a routine visit, or \"check-up,\" with your doctor. You might also hear it called a \"wellness visit\" or \"preventive visit.\"  During each visit, the doctor will:   Ask about your physical and mental health   Ask about your habits, behaviors, and lifestyle   Do an exam   Give you vaccines if needed   Talk to you about any medicines you take   Give advice about your health   Answer your questions  Getting regular check-ups is an important part of taking care of your health. It can help your doctor find and treat any problems you have. But it's also important for preventing health problems.  A routine physical is different from a \"sick visit.\" A sick visit is when you see a doctor because of a health concern or problem. Since physicals are scheduled ahead of time, you can think about what you want to ask the doctor.  How often should I get a physical? -- It depends on your age and health. In general, for people age 21 years and older:   If you are younger than 50 years, you might be able to get a physical every 3 years.   If you are 50 years or older, your doctor might recommend a physical every year.  If you have an ongoing health condition, like diabetes or high blood pressure, your doctor will probably want to see you more often.  What happens during a physical? -- In general, each visit will include:   Physical exam - The doctor or nurse will check your height, weight, heart rate, and blood pressure. They will also look at your eyes and ears. They will ask about how you are feeling and whether you have any symptoms that bother you.   Medicines - It's a good idea to bring a list of all the medicines you take to each doctor visit. Your doctor will talk to you about your medicines and answer any questions. Tell them if you are having any " "side effects that bother you. You should also tell them if you are having trouble paying for any of your medicines.   Habits and behaviors - This includes:   Your diet   Your exercise habits   Whether you smoke, drink alcohol, or use drugs   Whether you are sexually active   Whether you feel safe at home  Your doctor will talk to you about things you can do to improve your health and lower your risk of health problems. They will also offer help and support. For example, if you want to quit smoking, they can give you advice and might prescribe medicines. If you want to improve your diet or get more physical activity, they can help you with this, too.   Lab tests, if needed - The tests you get will depend on your age and situation. For example, your doctor might want to check your:   Cholesterol   Blood sugar   Iron level   Vaccines - The recommended vaccines will depend on your age, health, and what vaccines you already had. Vaccines are very important because they can prevent certain serious or deadly infections.   Discussion of screening - \"Screening\" means checking for diseases or other health problems before they cause symptoms. Your doctor can recommend screening based on your age, risk, and preferences. This might include tests to check for:   Cancer, such as breast, prostate, cervical, ovarian, colorectal, prostate, lung, or skin cancer   Sexually transmitted infections, such as chlamydia and gonorrhea   Mental health conditions like depression and anxiety  Your doctor will talk to you about the different types of screening tests. They can help you decide which screenings to have. They can also explain what the results might mean.   Answering questions - The physical is a good time to ask the doctor or nurse questions about your health. If needed, they can refer you to other doctors or specialists, too.  Adults older than 65 years often need other care, too. As you get older, your doctor will talk to you " about:   How to prevent falling at home   Hearing or vision tests   Memory testing   How to take your medicines safely   Making sure that you have the help and support you need at home  All topics are updated as new evidence becomes available and our peer review process is complete.  This topic retrieved from [employer] on: May 02, 2024.  Topic 678565 Version 1.0  Release: 32.4.3 - C32.122  © 2024 UpToDate, Inc[employer] and/or its affiliates. All rights reserved.  Consumer Information Use and Disclaimer   Disclaimer: This generalized information is a limited summary of diagnosis, treatment, and/or medication information. It is not meant to be comprehensive and should be used as a tool to help the user understand and/or assess potential diagnostic and treatment options. It does NOT include all information about conditions, treatments, medications, side effects, or risks that may apply to a specific patient. It is not intended to be medical advice or a substitute for the medical advice, diagnosis, or treatment of a health care provider based on the health care provider's examination and assessment of a patient's specific and unique circumstances. Patients must speak with a health care provider for complete information about their health, medical questions, and treatment options, including any risks or benefits regarding use of medications. This information does not endorse any treatments or medications as safe, effective, or approved for treating a specific patient. UpToDate, Inc. and its affiliates disclaim any warranty or liability relating to this information or the use thereof.The use of this information is governed by the Terms of Use, available at https://www.wolterskluwer.com/en/know/clinical-effectiveness-terms. 2024© UpToDate, Inc. and its affiliates and/or licensors. All rights reserved.  Copyright   © 2024 Up[employer]Date, Inc. and/or its affiliates. All rights reserved.

## 2025-07-22 NOTE — ASSESSMENT & PLAN NOTE
Bharti Li, a 40/F, presents to establish primary care. She has history significant for plaque psoriasis and hysterectomy in 2022 for AUB and ALVERTO III on pap smear. She follows with Dermatology and Obs-Gyn.  She complains of bony swelling in Rt chest area. It is mildly tender and patient does not know about the onset of the swelling but recently noticed the swelling and wanted to get evaluated.   Orders:    Lipid panel; Future    Comprehensive metabolic panel; Future    TSH, 3rd generation; Future    Vitamin D 25 hydroxy; Future    Mammo breast specimen bilateral (no charge); Future

## 2025-07-23 ENCOUNTER — APPOINTMENT (OUTPATIENT)
Dept: LAB | Facility: CLINIC | Age: 40
End: 2025-07-23
Payer: COMMERCIAL

## 2025-07-23 DIAGNOSIS — Z76.89 ENCOUNTER TO ESTABLISH CARE: ICD-10-CM

## 2025-07-23 DIAGNOSIS — Z13.1 SCREENING FOR DIABETES MELLITUS: ICD-10-CM

## 2025-07-23 LAB
25(OH)D3 SERPL-MCNC: 28.1 NG/ML (ref 30–100)
ALBUMIN SERPL BCG-MCNC: 4.4 G/DL (ref 3.5–5)
ALP SERPL-CCNC: 63 U/L (ref 34–104)
ALT SERPL W P-5'-P-CCNC: 14 U/L (ref 7–52)
ANION GAP SERPL CALCULATED.3IONS-SCNC: 8 MMOL/L (ref 4–13)
AST SERPL W P-5'-P-CCNC: 16 U/L (ref 13–39)
BILIRUB SERPL-MCNC: 1.06 MG/DL (ref 0.2–1)
BUN SERPL-MCNC: 11 MG/DL (ref 5–25)
CALCIUM SERPL-MCNC: 9.3 MG/DL (ref 8.4–10.2)
CHLORIDE SERPL-SCNC: 104 MMOL/L (ref 96–108)
CHOLEST SERPL-MCNC: 121 MG/DL (ref ?–200)
CO2 SERPL-SCNC: 25 MMOL/L (ref 21–32)
CREAT SERPL-MCNC: 0.5 MG/DL (ref 0.6–1.3)
EST. AVERAGE GLUCOSE BLD GHB EST-MCNC: 108 MG/DL
GFR SERPL CREATININE-BSD FRML MDRD: 119 ML/MIN/1.73SQ M
GLUCOSE P FAST SERPL-MCNC: 82 MG/DL (ref 65–99)
HBA1C MFR BLD: 5.4 %
HDLC SERPL-MCNC: 63 MG/DL
LDLC SERPL CALC-MCNC: 41 MG/DL (ref 0–100)
NONHDLC SERPL-MCNC: 58 MG/DL
POTASSIUM SERPL-SCNC: 3.9 MMOL/L (ref 3.5–5.3)
PROT SERPL-MCNC: 7.2 G/DL (ref 6.4–8.4)
SODIUM SERPL-SCNC: 137 MMOL/L (ref 135–147)
TRIGL SERPL-MCNC: 86 MG/DL (ref ?–150)
TSH SERPL DL<=0.05 MIU/L-ACNC: 1.08 UIU/ML (ref 0.45–4.5)

## 2025-07-23 PROCEDURE — 80061 LIPID PANEL: CPT

## 2025-07-23 PROCEDURE — 80053 COMPREHEN METABOLIC PANEL: CPT

## 2025-07-23 PROCEDURE — 36415 COLL VENOUS BLD VENIPUNCTURE: CPT

## 2025-07-23 PROCEDURE — 82306 VITAMIN D 25 HYDROXY: CPT

## 2025-07-23 PROCEDURE — 84443 ASSAY THYROID STIM HORMONE: CPT

## 2025-07-23 PROCEDURE — 83036 HEMOGLOBIN GLYCOSYLATED A1C: CPT

## 2025-07-24 PROBLEM — Z00.00 ANNUAL PHYSICAL EXAM: Status: ACTIVE | Noted: 2025-07-22

## 2025-07-25 NOTE — ASSESSMENT & PLAN NOTE
Bharti Li, a 40/F, presents to establish primary care. She has history significant for plaque psoriasis and hysterectomy in 2022 for AUB and ALVERTO III on pap smear. She follows with Dermatology and Obs-Gyn.  She complains of bony swelling in Rt chest area. It is mildly tender and patient does not know about the onset of the swelling but recently noticed the swelling and wanted to get evaluated.   She is due for an annual physical exam.

## (undated) DEVICE — 3000CC GUARDIAN II: Brand: GUARDIAN

## (undated) DEVICE — 1840 FOAM BLOCK NEEDLE COUNTER: Brand: DEVON

## (undated) DEVICE — LEEP LOOP ELECTRODE WIDE 20 X 15

## (undated) DEVICE — STERILE 8 INCH PROCTO SWAB: Brand: CARDINAL HEALTH

## (undated) DEVICE — PENCIL,HOLSTER: Brand: VALLEYLAB

## (undated) DEVICE — UNDER BUTTOCKS DRAPE W/FLUID CONTROL POUCH: Brand: CONVERTORS

## (undated) DEVICE — Device

## (undated) DEVICE — ANTI-FOG SOLUTION WITH FOAM PAD: Brand: DEVON

## (undated) DEVICE — ANTIBACTERIAL UNDYED BRAIDED (POLYGLACTIN 910), SYNTHETIC ABSORBABLE SUTURE: Brand: COATED VICRYL

## (undated) DEVICE — SPONGE STICK WITH PVP-I: Brand: KENDALL

## (undated) DEVICE — REM POLYHESIVE ADULT PATIENT RETURN ELECTRODE: Brand: VALLEYLAB

## (undated) DEVICE — BETHLEHEM UNIVERSAL GYN LAP PK: Brand: CARDINAL HEALTH

## (undated) DEVICE — INTENDED FOR TISSUE SEPARATION, AND OTHER PROCEDURES THAT REQUIRE A SHARP SURGICAL BLADE TO PUNCTURE OR CUT.: Brand: BARD-PARKER SAFETY BLADES SIZE 11, STERILE

## (undated) DEVICE — ENSEAL 20 CM SHAFT, LARGE JAW: Brand: ENSEAL X1

## (undated) DEVICE — TUBING SMOKE EVAC W/FILTRATION DEVICE PLUMEPORT ACTIV

## (undated) DEVICE — TROCAR: Brand: KII FIOS FIRST ENTRY

## (undated) DEVICE — SUT VICRYL 0 REEL 54 IN J287G

## (undated) DEVICE — DRAPE SHEET THREE QUARTER

## (undated) DEVICE — SYRINGE 10ML LL

## (undated) DEVICE — TRAY FOLEY 16FR URIMETER SURESTEP

## (undated) DEVICE — ADHESIVE SKIN HIGH VISCOSITY EXOFIN 1ML

## (undated) DEVICE — INTENDED FOR TISSUE SEPARATION, AND OTHER PROCEDURES THAT REQUIRE A SHARP SURGICAL BLADE TO PUNCTURE OR CUT.: Brand: BARD-PARKER ® CARBON RIB-BACK BLADES

## (undated) DEVICE — ADHESIVE SKN CLSR HISTOACRYL FLEX 0.5ML LF

## (undated) DEVICE — VISUALIZATION SYSTEM: Brand: CLEARIFY

## (undated) DEVICE — SUT VICRYL 3-0 PS-2 18 IN J497G

## (undated) DEVICE — CHLORAPREP HI-LITE 26ML ORANGE

## (undated) DEVICE — PVC URETHRAL CATHETER: Brand: DOVER

## (undated) DEVICE — SCD SEQUENTIAL COMPRESSION COMFORT SLEEVE MEDIUM KNEE LENGTH: Brand: KENDALL SCD

## (undated) DEVICE — ROSEBUD DISSECTORS: Brand: DEROYAL

## (undated) DEVICE — STERILE MINOR LAPAROSCOPY PACK: Brand: CARDINAL HEALTH

## (undated) DEVICE — ENDOPATH XCEL BLADELESS TROCARS WITH STABILITY SLEEVES: Brand: ENDOPATH XCEL

## (undated) DEVICE — MAYO STAND COVER: Brand: CONVERTORS

## (undated) DEVICE — SUT STRATAFIX SPIRAL PLUS 3-0 PS-2 30 X 30 CM SXMP2B408

## (undated) DEVICE — SUT SILK 3-0 SH 30 IN K832H

## (undated) DEVICE — 40595 XL TRENDELENBURG POSITIONING KIT: Brand: 40595 XL TRENDELENBURG POSITIONING KIT

## (undated) DEVICE — SUT VICRYL 0 CT-1 27 IN J260H

## (undated) DEVICE — MEDI-VAC YANK SUCT HNDL W/TPRD BULBOUS TIP: Brand: CARDINAL HEALTH

## (undated) DEVICE — TROCAR: Brand: KII® SLEEVE

## (undated) DEVICE — CONMED ACCESSORY ELECTRODE, 3/16" (5 MM) BALL: Brand: CONMED

## (undated) DEVICE — 3M™ STERI-STRIP™ REINFORCED ADHESIVE SKIN CLOSURES, R1547, 1/2 IN X 4 IN (12 MM X 100 MM), 6 STRIPS/ENVELOPE: Brand: 3M™ STERI-STRIP™

## (undated) DEVICE — INSUFLATION TUBING INSUFLOW (LEXION)

## (undated) DEVICE — GLOVE INDICATOR PI UNDERGLOVE SZ 7.5 BLUE

## (undated) DEVICE — ENDOPATH XCEL UNIVERSAL TROCAR STABLILITY SLEEVES: Brand: ENDOPATH XCEL

## (undated) DEVICE — ENSEAL X1 TISSUE SEALER, CURVED JAW, 37 CM SHAFT LENGTH: Brand: ENSEAL

## (undated) DEVICE — CYSTO TUBING SINGLE IRRIGATION

## (undated) DEVICE — SUT STRATAFIX SPIRAL PDS PLUS 1 CTX 18 IN SXPP1A400

## (undated) DEVICE — GLOVE PI ULTRA TOUCH SZ.7.0

## (undated) DEVICE — CHLORHEXIDINE 4PCT 4 OZ

## (undated) DEVICE — BETHLEHEM UNIVERSAL LAPAROTOMY: Brand: CARDINAL HEALTH

## (undated) DEVICE — GLOVE INDICATOR PI UNDERGLOVE SZ 8 BLUE

## (undated) DEVICE — UTERINE MANIPULATOR 4.5MM STRL

## (undated) DEVICE — VIOLET BRAIDED (POLYGLACTIN 910), SYNTHETIC ABSORBABLE SUTURE: Brand: COATED VICRYL

## (undated) DEVICE — GLOVE PI ULTRA TOUCH SZ.7.5

## (undated) DEVICE — TRAY FOLEY 16FR URIMETER SILICONE SURESTEP

## (undated) DEVICE — ELECTRODE BALL E-Z CLEAN 2IN -0015

## (undated) DEVICE — INTENDED FOR TISSUE SEPARATION, AND OTHER PROCEDURES THAT REQUIRE A SHARP SURGICAL BLADE TO PUNCTURE OR CUT.: Brand: BARD-PARKER SAFETY BLADES SIZE 10, STERILE

## (undated) DEVICE — BETHLEHEM UNIVERSAL MINOR VAG: Brand: CARDINAL HEALTH

## (undated) DEVICE — PREMIUM DRY TRAY LF: Brand: MEDLINE INDUSTRIES, INC.

## (undated) DEVICE — MEDI-VAC NON-CONDUCTIVE SUCTION TUBING 6MM X 1.8M (6FT.) L: Brand: CARDINAL HEALTH

## (undated) DEVICE — GLOVE SRG BIOGEL ECLIPSE 7.5